# Patient Record
Sex: MALE | Race: WHITE | HISPANIC OR LATINO | URBAN - METROPOLITAN AREA
[De-identification: names, ages, dates, MRNs, and addresses within clinical notes are randomized per-mention and may not be internally consistent; named-entity substitution may affect disease eponyms.]

---

## 2017-04-05 ENCOUNTER — OUTPATIENT (OUTPATIENT)
Dept: OUTPATIENT SERVICES | Facility: HOSPITAL | Age: 37
LOS: 1 days | End: 2017-04-05
Payer: SELF-PAY

## 2017-04-05 ENCOUNTER — APPOINTMENT (OUTPATIENT)
Dept: FAMILY MEDICINE | Facility: HOSPITAL | Age: 37
End: 2017-04-05

## 2017-04-05 VITALS
SYSTOLIC BLOOD PRESSURE: 145 MMHG | TEMPERATURE: 98 F | HEART RATE: 86 BPM | WEIGHT: 315 LBS | BODY MASS INDEX: 46.65 KG/M2 | HEIGHT: 69 IN | DIASTOLIC BLOOD PRESSURE: 94 MMHG | RESPIRATION RATE: 16 BRPM

## 2017-04-05 PROCEDURE — 82607 VITAMIN B-12: CPT

## 2017-04-05 PROCEDURE — 85652 RBC SED RATE AUTOMATED: CPT

## 2017-04-05 PROCEDURE — G0463: CPT

## 2017-04-05 PROCEDURE — 84443 ASSAY THYROID STIM HORMONE: CPT

## 2017-04-05 PROCEDURE — 36415 COLL VENOUS BLD VENIPUNCTURE: CPT

## 2017-04-05 PROCEDURE — 82746 ASSAY OF FOLIC ACID SERUM: CPT

## 2017-04-29 ENCOUNTER — APPOINTMENT (OUTPATIENT)
Dept: FAMILY MEDICINE | Facility: HOSPITAL | Age: 37
End: 2017-04-29

## 2017-04-29 ENCOUNTER — OUTPATIENT (OUTPATIENT)
Dept: OUTPATIENT SERVICES | Facility: HOSPITAL | Age: 37
LOS: 1 days | End: 2017-04-29
Payer: SELF-PAY

## 2017-04-29 ENCOUNTER — RESULT CHARGE (OUTPATIENT)
Age: 37
End: 2017-04-29

## 2017-04-29 VITALS — DIASTOLIC BLOOD PRESSURE: 88 MMHG | SYSTOLIC BLOOD PRESSURE: 145 MMHG

## 2017-04-29 VITALS
OXYGEN SATURATION: 96 % | HEART RATE: 73 BPM | RESPIRATION RATE: 14 BRPM | DIASTOLIC BLOOD PRESSURE: 98 MMHG | SYSTOLIC BLOOD PRESSURE: 146 MMHG | TEMPERATURE: 97.6 F

## 2017-04-29 DIAGNOSIS — Z86.79 PERSONAL HISTORY OF OTHER DISEASES OF THE CIRCULATORY SYSTEM: ICD-10-CM

## 2017-04-29 DIAGNOSIS — Z87.891 PERSONAL HISTORY OF NICOTINE DEPENDENCE: ICD-10-CM

## 2017-04-29 LAB
BILIRUB UR QL STRIP: NEGATIVE
CLARITY UR: CLEAR
COLLECTION METHOD: NORMAL
GLUCOSE UR-MCNC: NEGATIVE
HCG UR QL: 0.2 EU/DL
HGB UR QL STRIP.AUTO: NORMAL
KETONES UR-MCNC: NEGATIVE
LEUKOCYTE ESTERASE UR QL STRIP: NEGATIVE
NITRITE UR QL STRIP: NEGATIVE
PH UR STRIP: 5.5
PROT UR STRIP-MCNC: NEGATIVE
SP GR UR STRIP: 1.02

## 2017-04-29 PROCEDURE — G0463: CPT

## 2017-09-06 ENCOUNTER — MEDICATION RENEWAL (OUTPATIENT)
Age: 37
End: 2017-09-06

## 2017-09-11 ENCOUNTER — OUTPATIENT (OUTPATIENT)
Dept: OUTPATIENT SERVICES | Facility: HOSPITAL | Age: 37
LOS: 1 days | End: 2017-09-11
Payer: SELF-PAY

## 2017-09-11 ENCOUNTER — APPOINTMENT (OUTPATIENT)
Dept: FAMILY MEDICINE | Facility: HOSPITAL | Age: 37
End: 2017-09-11

## 2017-09-11 VITALS
SYSTOLIC BLOOD PRESSURE: 137 MMHG | TEMPERATURE: 98.6 F | BODY MASS INDEX: 62.02 KG/M2 | DIASTOLIC BLOOD PRESSURE: 90 MMHG | HEART RATE: 75 BPM | RESPIRATION RATE: 16 BRPM | OXYGEN SATURATION: 97 % | WEIGHT: 315 LBS

## 2017-09-11 DIAGNOSIS — Z63.4 DISAPPEARANCE AND DEATH OF FAMILY MEMBER: ICD-10-CM

## 2017-09-11 RX ORDER — PREDNISONE 20 MG/1
20 TABLET ORAL DAILY
Qty: 14 | Refills: 0 | Status: DISCONTINUED | COMMUNITY
Start: 2017-04-29 | End: 2017-09-11

## 2017-09-11 SDOH — SOCIAL STABILITY - SOCIAL INSECURITY: DISSAPEARANCE AND DEATH OF FAMILY MEMBER: Z63.4

## 2017-09-12 PROCEDURE — 80061 LIPID PANEL: CPT

## 2017-09-12 PROCEDURE — G0463: CPT

## 2017-09-12 PROCEDURE — 83036 HEMOGLOBIN GLYCOSYLATED A1C: CPT

## 2017-09-12 PROCEDURE — 80053 COMPREHEN METABOLIC PANEL: CPT

## 2017-09-28 ENCOUNTER — EMERGENCY (EMERGENCY)
Facility: HOSPITAL | Age: 37
LOS: 1 days | Discharge: HOME | End: 2017-09-28
Admitting: EMERGENCY MEDICINE
Payer: SELF-PAY

## 2017-09-28 PROCEDURE — 99284 EMERGENCY DEPT VISIT MOD MDM: CPT

## 2017-09-28 PROCEDURE — 93010 ELECTROCARDIOGRAM REPORT: CPT

## 2017-09-28 PROCEDURE — 71010: CPT | Mod: 26

## 2017-09-29 PROCEDURE — 86788 WEST NILE VIRUS AB IGM: CPT

## 2017-09-29 PROCEDURE — 85027 COMPLETE CBC AUTOMATED: CPT

## 2017-09-29 PROCEDURE — 87086 URINE CULTURE/COLONY COUNT: CPT

## 2017-09-29 PROCEDURE — 96365 THER/PROPH/DIAG IV INF INIT: CPT

## 2017-09-29 PROCEDURE — 96375 TX/PRO/DX INJ NEW DRUG ADDON: CPT

## 2017-09-29 PROCEDURE — 96367 TX/PROPH/DG ADDL SEQ IV INF: CPT

## 2017-09-29 PROCEDURE — 71045 X-RAY EXAM CHEST 1 VIEW: CPT

## 2017-09-29 PROCEDURE — 85730 THROMBOPLASTIN TIME PARTIAL: CPT

## 2017-09-29 PROCEDURE — 93005 ELECTROCARDIOGRAM TRACING: CPT

## 2017-09-29 PROCEDURE — 86789 WEST NILE VIRUS ANTIBODY: CPT

## 2017-09-29 PROCEDURE — 80053 COMPREHEN METABOLIC PANEL: CPT

## 2017-09-29 PROCEDURE — 85610 PROTHROMBIN TIME: CPT

## 2017-09-29 PROCEDURE — 83605 ASSAY OF LACTIC ACID: CPT

## 2017-09-29 PROCEDURE — 81003 URINALYSIS AUTO W/O SCOPE: CPT

## 2017-09-29 PROCEDURE — 87798 DETECT AGENT NOS DNA AMP: CPT

## 2017-09-29 PROCEDURE — 99284 EMERGENCY DEPT VISIT MOD MDM: CPT | Mod: 25

## 2017-09-29 PROCEDURE — 87040 BLOOD CULTURE FOR BACTERIA: CPT

## 2017-10-01 ENCOUNTER — EMERGENCY (EMERGENCY)
Facility: HOSPITAL | Age: 37
LOS: 1 days | Discharge: HOME | End: 2017-10-01
Admitting: EMERGENCY MEDICINE
Payer: SELF-PAY

## 2017-10-01 PROCEDURE — 99053 MED SERV 10PM-8AM 24 HR FAC: CPT

## 2017-10-01 PROCEDURE — 70450 CT HEAD/BRAIN W/O DYE: CPT | Mod: 26

## 2017-10-01 PROCEDURE — 99285 EMERGENCY DEPT VISIT HI MDM: CPT | Mod: 25

## 2017-10-02 ENCOUNTER — OUTPATIENT (OUTPATIENT)
Dept: OUTPATIENT SERVICES | Facility: HOSPITAL | Age: 37
LOS: 1 days | End: 2017-10-02
Payer: SELF-PAY

## 2017-10-02 ENCOUNTER — APPOINTMENT (OUTPATIENT)
Dept: FAMILY MEDICINE | Facility: HOSPITAL | Age: 37
End: 2017-10-02

## 2017-10-02 VITALS
TEMPERATURE: 98.7 F | BODY MASS INDEX: 60.25 KG/M2 | DIASTOLIC BLOOD PRESSURE: 84 MMHG | HEART RATE: 82 BPM | RESPIRATION RATE: 16 BRPM | WEIGHT: 315 LBS | OXYGEN SATURATION: 100 % | SYSTOLIC BLOOD PRESSURE: 130 MMHG

## 2017-10-02 PROCEDURE — 96374 THER/PROPH/DIAG INJ IV PUSH: CPT

## 2017-10-02 PROCEDURE — 99284 EMERGENCY DEPT VISIT MOD MDM: CPT | Mod: 25

## 2017-10-02 PROCEDURE — 86787 VARICELLA-ZOSTER ANTIBODY: CPT

## 2017-10-02 PROCEDURE — 36415 COLL VENOUS BLD VENIPUNCTURE: CPT

## 2017-10-02 PROCEDURE — 80048 BASIC METABOLIC PNL TOTAL CA: CPT

## 2017-10-02 PROCEDURE — 70450 CT HEAD/BRAIN W/O DYE: CPT

## 2017-10-02 PROCEDURE — 85027 COMPLETE CBC AUTOMATED: CPT

## 2017-10-02 PROCEDURE — G0463: CPT

## 2017-10-02 PROCEDURE — 96375 TX/PRO/DX INJ NEW DRUG ADDON: CPT

## 2017-10-03 ENCOUNTER — OUTPATIENT (OUTPATIENT)
Dept: OUTPATIENT SERVICES | Facility: HOSPITAL | Age: 37
LOS: 1 days | End: 2017-10-03
Payer: COMMERCIAL

## 2017-10-03 ENCOUNTER — OUTPATIENT (OUTPATIENT)
Dept: OUTPATIENT SERVICES | Facility: HOSPITAL | Age: 37
LOS: 1 days | End: 2017-10-03

## 2017-10-03 ENCOUNTER — APPOINTMENT (OUTPATIENT)
Dept: MRI IMAGING | Facility: CLINIC | Age: 37
End: 2017-10-03
Payer: SELF-PAY

## 2017-10-03 DIAGNOSIS — R51 HEADACHE: ICD-10-CM

## 2017-10-03 PROCEDURE — 70551 MRI BRAIN STEM W/O DYE: CPT | Mod: 26

## 2017-10-03 PROCEDURE — 70551 MRI BRAIN STEM W/O DYE: CPT

## 2017-10-05 ENCOUNTER — APPOINTMENT (OUTPATIENT)
Dept: FAMILY MEDICINE | Facility: HOSPITAL | Age: 37
End: 2017-10-05

## 2017-10-05 ENCOUNTER — OUTPATIENT (OUTPATIENT)
Dept: OUTPATIENT SERVICES | Facility: HOSPITAL | Age: 37
LOS: 1 days | End: 2017-10-05
Payer: SELF-PAY

## 2017-10-05 VITALS
SYSTOLIC BLOOD PRESSURE: 127 MMHG | DIASTOLIC BLOOD PRESSURE: 83 MMHG | TEMPERATURE: 98.6 F | BODY MASS INDEX: 60.25 KG/M2 | RESPIRATION RATE: 16 BRPM | HEART RATE: 89 BPM | OXYGEN SATURATION: 97 % | WEIGHT: 315 LBS

## 2017-10-05 PROCEDURE — G0463: CPT

## 2018-01-04 ENCOUNTER — APPOINTMENT (OUTPATIENT)
Dept: NEUROLOGY | Facility: HOSPITAL | Age: 38
End: 2018-01-04

## 2018-03-10 ENCOUNTER — EMERGENCY (EMERGENCY)
Facility: HOSPITAL | Age: 38
LOS: 1 days | Discharge: HOME | End: 2018-03-10
Admitting: EMERGENCY MEDICINE
Payer: SELF-PAY

## 2018-03-10 ENCOUNTER — EMERGENCY (EMERGENCY)
Facility: HOSPITAL | Age: 38
LOS: 1 days | Discharge: HOME | End: 2018-03-10
Attending: EMERGENCY MEDICINE | Admitting: EMERGENCY MEDICINE
Payer: SELF-PAY

## 2018-03-10 VITALS
SYSTOLIC BLOOD PRESSURE: 164 MMHG | TEMPERATURE: 98 F | OXYGEN SATURATION: 95 % | RESPIRATION RATE: 16 BRPM | HEART RATE: 80 BPM | DIASTOLIC BLOOD PRESSURE: 97 MMHG

## 2018-03-10 DIAGNOSIS — Z90.49 ACQUIRED ABSENCE OF OTHER SPECIFIED PARTS OF DIGESTIVE TRACT: Chronic | ICD-10-CM

## 2018-03-10 LAB
BASOPHILS # BLD AUTO: 0.05 K/UL — SIGNIFICANT CHANGE UP (ref 0–0.2)
BASOPHILS NFR BLD AUTO: 0.5 % — SIGNIFICANT CHANGE UP (ref 0–2)
EOSINOPHIL # BLD AUTO: 0.13 K/UL — SIGNIFICANT CHANGE UP (ref 0–0.5)
EOSINOPHIL NFR BLD AUTO: 1.3 % — SIGNIFICANT CHANGE UP (ref 0–6)
HCT VFR BLD CALC: 41.5 % — SIGNIFICANT CHANGE UP (ref 39–50)
HGB BLD-MCNC: 13.1 G/DL — SIGNIFICANT CHANGE UP (ref 13–17)
IMM GRANULOCYTES # BLD AUTO: 0.05 # — SIGNIFICANT CHANGE UP
IMM GRANULOCYTES NFR BLD AUTO: 0.5 % — SIGNIFICANT CHANGE UP (ref 0–1.5)
LYMPHOCYTES # BLD AUTO: 3.1 K/UL — SIGNIFICANT CHANGE UP (ref 1–3.3)
LYMPHOCYTES # BLD AUTO: 31.1 % — SIGNIFICANT CHANGE UP (ref 13–44)
MCHC RBC-ENTMCNC: 29.1 PG — SIGNIFICANT CHANGE UP (ref 27–34)
MCHC RBC-ENTMCNC: 31.6 % — LOW (ref 32–36)
MCV RBC AUTO: 92.2 FL — SIGNIFICANT CHANGE UP (ref 80–100)
MONOCYTES # BLD AUTO: 0.48 K/UL — SIGNIFICANT CHANGE UP (ref 0–0.9)
MONOCYTES NFR BLD AUTO: 4.8 % — SIGNIFICANT CHANGE UP (ref 2–14)
NEUTROPHILS # BLD AUTO: 6.16 K/UL — SIGNIFICANT CHANGE UP (ref 1.8–7.4)
NEUTROPHILS NFR BLD AUTO: 61.8 % — SIGNIFICANT CHANGE UP (ref 43–77)
NRBC # FLD: 0 — SIGNIFICANT CHANGE UP
PLATELET # BLD AUTO: 266 K/UL — SIGNIFICANT CHANGE UP (ref 150–400)
PMV BLD: 10.1 FL — SIGNIFICANT CHANGE UP (ref 7–13)
RBC # BLD: 4.5 M/UL — SIGNIFICANT CHANGE UP (ref 4.2–5.8)
RBC # FLD: 12.6 % — SIGNIFICANT CHANGE UP (ref 10.3–14.5)
WBC # BLD: 9.97 K/UL — SIGNIFICANT CHANGE UP (ref 3.8–10.5)
WBC # FLD AUTO: 9.97 K/UL — SIGNIFICANT CHANGE UP (ref 3.8–10.5)

## 2018-03-10 PROCEDURE — 99285 EMERGENCY DEPT VISIT HI MDM: CPT

## 2018-03-10 RX ORDER — KETOROLAC TROMETHAMINE 30 MG/ML
30 SYRINGE (ML) INJECTION ONCE
Qty: 0 | Refills: 0 | Status: DISCONTINUED | OUTPATIENT
Start: 2018-03-10 | End: 2018-03-10

## 2018-03-10 RX ORDER — DIPHENHYDRAMINE HCL 50 MG
25 CAPSULE ORAL ONCE
Qty: 0 | Refills: 0 | Status: COMPLETED | OUTPATIENT
Start: 2018-03-10 | End: 2018-03-10

## 2018-03-10 RX ORDER — MUPIROCIN 20 MG/G
1 OINTMENT TOPICAL
Qty: 0 | Refills: 0 | Status: DISCONTINUED | OUTPATIENT
Start: 2018-03-10 | End: 2018-03-14

## 2018-03-10 RX ADMIN — Medication 30 MILLIGRAM(S): at 23:35

## 2018-03-10 RX ADMIN — Medication 30 MILLIGRAM(S): at 23:50

## 2018-03-10 RX ADMIN — Medication 25 MILLIGRAM(S): at 23:42

## 2018-03-10 NOTE — ED ADULT TRIAGE NOTE - CHIEF COMPLAINT QUOTE
Pt brought in as transfer from [place] with c/o abscess to L ear x 3d. States buzzing/ringing in affected ear with mild discharge. Also c/o itching spreading to neck area. Denies HA, fevers, or any adverse symptoms at this time.

## 2018-03-10 NOTE — ED PROVIDER NOTE - PROGRESS NOTE DETAILS
Carlos att: ENT incising and draining left earlobe. CDU to accept patient for iv abx and reassessment pending CDU KELLIE reddy.

## 2018-03-10 NOTE — ED PROVIDER NOTE - OBJECTIVE STATEMENT
23:35 Carlos att: 38M transfer from [place] left ear abscess +/- perichondritis. Non-diabetic obese male. Three days ago patient scratched his left ear. Following day significant other noted lt ear swelling and placed alcohol and bacitracin. Since that time swelling redness and tenderness extended to left posterior auricular area, left upper neck and left cheek. Denies fever, chills, auditory changes. Eval [place] ED, 18:13 rec'd iv cipro for pseudomonal coverage and Iv vanco for mrsa coverage, transfer to [facility] acccepted by [facility] ENT Dr. Rios. 23:35 Patient eval in [facility] Intake by ENT resident, neg CT will IND and send wound culture.

## 2018-03-10 NOTE — ED ADULT NURSE NOTE - ED STAT RN HANDOFF DETAILS
Patient is A&Ox4, aware of plan of care and in NAD, and has CDU room available.  Report given to MAZIN Saavedra.  Patient awaiting transportation.  Will continue to monitor patient closely. DUSTY Bajwa R.N.

## 2018-03-10 NOTE — ED PROVIDER NOTE - MEDICAL DECISION MAKING DETAILS
Left earlobe abscess with surrounding left face cellulitis PLAN ent c/s, ent to ind, cdu for iv abx and reassessment

## 2018-03-10 NOTE — ED ADULT NURSE NOTE - OBJECTIVE STATEMENT
rec'd pt in room intake 3 with wife at [facility]. pt is trfr from [place] waiting for ent. has swelling, pain, discharge from left external ear. waiting to be seen. pt didn't take his bp meds today. md aware. rec'd pt in room intake 3 with wife at [facility]. pt is trfr from [place] waiting for ent. has swelling, pain, discharge from left external ear. waiting to be seen. pt didn't take his bp meds today. md aware. PT TAKES ENALAPRIL 25 AND HCTZ 12.5

## 2018-03-10 NOTE — ED ADULT NURSE NOTE - CHIEF COMPLAINT QUOTE
Pt brought in as transfer from [place] with c/o abscess to L ear x 3d. States buzzing/ringing in affected ear with mild discharge. Also c/o itching spreading to neck area. Denies HA, fevers, or any adverse symptoms at this time.

## 2018-03-10 NOTE — ED PROVIDER NOTE - SKIN COLOR
lt earlobe flaky red swollen tender, neg cartilage swelling, lt tm and ear canal clear. Redness and swelling extends from lt temple, entire left cheek and down to left jawline

## 2018-03-10 NOTE — CONSULT NOTE ADULT - SUBJECTIVE AND OBJECTIVE BOX
38 M hx of htn and obesity presents as transfer with L ear pain.  Pt reports having a small pustule 2-3 days ago which he scratched off and a small amount of pus came out.  He reports it has grown progressively more swollen and sore since then so he came to ED.  No hearing loss, tinnitus, dizziness, otorrhea.  He reports the surrounding skin has become somewhat itchy today.    PMH/PSH above plus YISSEL  Allergies: morphine, PCN    Vital Signs Last 24 Hrs  T(C): 36.9 (10 Mar 2018 22:19), Max: 36.9 (10 Mar 2018 22:19)  T(F): 98.5 (10 Mar 2018 22:19), Max: 98.5 (10 Mar 2018 22:19)  HR: 76 (10 Mar 2018 22:56) (76 - 85)  BP: 153/85 (10 Mar 2018 22:56) (153/85 - 185/109)  BP(mean): --  RR: 18 (10 Mar 2018 22:56) (16 - 18)  SpO2: 98% (10 Mar 2018 22:56) (95% - 100%)    PHYSICAL EXAM:  Constitutional Normal: awake, alert,  well developed, no acute distress  Face symmetric, CNs grossly intact  AS: some crusting and fulness of lobule, EAC and TM normal, some linear urticaria overlying mastoid.  questionable fluctuance of lobule  ADS: Normal external ear, EAC, and TM		  External Nose:  Normal, no structural deformities	  Anterior Nasal Cavity patent bilaterally, pink moist mucosa			  Oral Cavity:  pink moist mucosa, tongue midline, no lesions 1+ tonsils, crowded oropharynx  Neck: obese      procedure: after verbal consent, 1cc of lidocaine with epi was injected in posterior lobule, a small incision was made and through skin into lobule fat and the wound was explored with a q tip and 18G needle.  No abscess.                          13.1   9.97  )-----------( 266      ( 10 Mar 2018 23:30 )             41.5

## 2018-03-11 VITALS
SYSTOLIC BLOOD PRESSURE: 156 MMHG | RESPIRATION RATE: 18 BRPM | DIASTOLIC BLOOD PRESSURE: 98 MMHG | TEMPERATURE: 98 F | OXYGEN SATURATION: 97 % | HEART RATE: 75 BPM

## 2018-03-11 LAB
ALBUMIN SERPL ELPH-MCNC: 3.9 G/DL — SIGNIFICANT CHANGE UP (ref 3.3–5)
ALP SERPL-CCNC: 93 U/L — SIGNIFICANT CHANGE UP (ref 40–120)
ALT FLD-CCNC: 31 U/L — SIGNIFICANT CHANGE UP (ref 4–41)
AST SERPL-CCNC: 20 U/L — SIGNIFICANT CHANGE UP (ref 4–40)
BILIRUB SERPL-MCNC: 0.5 MG/DL — SIGNIFICANT CHANGE UP (ref 0.2–1.2)
BUN SERPL-MCNC: 19 MG/DL — SIGNIFICANT CHANGE UP (ref 7–23)
CALCIUM SERPL-MCNC: 8.7 MG/DL — SIGNIFICANT CHANGE UP (ref 8.4–10.5)
CHLORIDE SERPL-SCNC: 105 MMOL/L — SIGNIFICANT CHANGE UP (ref 98–107)
CO2 SERPL-SCNC: 24 MMOL/L — SIGNIFICANT CHANGE UP (ref 22–31)
CREAT SERPL-MCNC: 0.82 MG/DL — SIGNIFICANT CHANGE UP (ref 0.5–1.3)
GLUCOSE SERPL-MCNC: 92 MG/DL — SIGNIFICANT CHANGE UP (ref 70–99)
HBA1C BLD-MCNC: 5.3 % — SIGNIFICANT CHANGE UP (ref 4–5.6)
POTASSIUM SERPL-MCNC: 3.6 MMOL/L — SIGNIFICANT CHANGE UP (ref 3.5–5.3)
POTASSIUM SERPL-SCNC: 3.6 MMOL/L — SIGNIFICANT CHANGE UP (ref 3.5–5.3)
PROT SERPL-MCNC: 7.7 G/DL — SIGNIFICANT CHANGE UP (ref 6–8.3)
SODIUM SERPL-SCNC: 143 MMOL/L — SIGNIFICANT CHANGE UP (ref 135–145)

## 2018-03-11 PROCEDURE — 85027 COMPLETE CBC AUTOMATED: CPT

## 2018-03-11 PROCEDURE — 80053 COMPREHEN METABOLIC PANEL: CPT

## 2018-03-11 PROCEDURE — 96375 TX/PRO/DX INJ NEW DRUG ADDON: CPT

## 2018-03-11 PROCEDURE — 96374 THER/PROPH/DIAG INJ IV PUSH: CPT

## 2018-03-11 PROCEDURE — 99236 HOSP IP/OBS SAME DATE HI 85: CPT

## 2018-03-11 PROCEDURE — 99285 EMERGENCY DEPT VISIT HI MDM: CPT | Mod: 25

## 2018-03-11 RX ORDER — HYDROCHLOROTHIAZIDE 25 MG
12.5 TABLET ORAL DAILY
Qty: 0 | Refills: 0 | Status: DISCONTINUED | OUTPATIENT
Start: 2018-03-11 | End: 2018-03-14

## 2018-03-11 RX ORDER — MUPIROCIN 20 MG/G
1 OINTMENT TOPICAL
Qty: 1 | Refills: 0
Start: 2018-03-11 | End: 2018-03-20

## 2018-03-11 RX ORDER — CIPROFLOXACIN LACTATE 400MG/40ML
400 VIAL (ML) INTRAVENOUS EVERY 12 HOURS
Qty: 0 | Refills: 0 | Status: DISCONTINUED | OUTPATIENT
Start: 2018-03-11 | End: 2018-03-14

## 2018-03-11 RX ADMIN — Medication 100 MILLIGRAM(S): at 04:23

## 2018-03-11 RX ADMIN — Medication 20 MILLIGRAM(S): at 06:01

## 2018-03-11 RX ADMIN — Medication 200 MILLIGRAM(S): at 06:01

## 2018-03-11 RX ADMIN — MUPIROCIN 1 APPLICATION(S): 20 OINTMENT TOPICAL at 00:35

## 2018-03-11 RX ADMIN — Medication 12.5 MILLIGRAM(S): at 06:09

## 2018-03-11 NOTE — ED CDU PROVIDER INITIAL DAY NOTE - MEDICAL DECISION MAKING DETAILS
38 year old male with a PMHx of sleep apnea, HTN pw left earlobe redness, swelling that extends to the neck for 3 days after scratching a scab from his ear  Plan: IV abx

## 2018-03-11 NOTE — ED CDU PROVIDER INITIAL DAY NOTE - PROGRESS NOTE DETAILS
Patient states he is feeling better after antibiotic administration. States facial swelling has improved. VS stable  Heart- RRR s1s2 nl Lungs - clear bilaterally abd - soft NT ND extrem- no edema or cyanosis  left ear with some induration and redness still present  face with minimal erythema surrounding left ear,  Plan for continued antibiotics, mupirocin ointment and ENT follow up as outpatient.

## 2018-03-11 NOTE — ED CDU PROVIDER DISPOSITION NOTE - CLINICAL COURSE
-placed in observation for IV abx/monitoring of cellulitis  -symptomatically improved, afebrile  -cleared by ENT for d/c home on oral clindamycin x 10d and mupirocin

## 2018-03-11 NOTE — PROGRESS NOTE ADULT - SUBJECTIVE AND OBJECTIVE BOX
Pt seen and examined this AM. No acute events overnight. Afebrile.   S/P I&D yesterday with no purulence obtained     Vital Signs Last 24 Hrs  T(C): 36.6 (11 Mar 2018 09:44), Max: 36.9 (10 Mar 2018 22:19)  T(F): 97.8 (11 Mar 2018 09:44), Max: 98.5 (10 Mar 2018 22:19)  HR: 75 (11 Mar 2018 09:44) (71 - 85)  BP: 156/98 (11 Mar 2018 09:44) (146/88 - 185/109)  BP(mean): --  RR: 18 (11 Mar 2018 09:44) (16 - 18)  SpO2: 97% (11 Mar 2018 09:44) (95% - 100%)    NAD, awake and alert   Face symmetric, CNs grossly intact  AS: crusting, erythema and fullness of lobule, EAC normal, lobule soft   ADS: Normal external ear, EAC		  External Nose:  Normal, no structural deformities	  Neck: obese        Assessment and Recommendation:   · Assessment		  38M with cellulitis isolated to left ear lobule.  No abscess on attempted I&D, no cartilage involvement, no extension onto face, neck or mastoid.  -clindamycin x10 days  -mupirocin ointment  -pain medication  -can dc from ENT standpoint  -f/u in 2 weeks in clinic

## 2018-03-11 NOTE — ED CDU PROVIDER INITIAL DAY NOTE - OBJECTIVE STATEMENT
38 year old male with a PMHx of sleep apnea, HTN pw left earlobe redness, swelling that extends to the neck for 3 days after scratching a scab from his ear. Pt was transferred from [facility] for ENT eval for abscess vs perichondritis. Pt received 1 dose of vanco/cipro at 6:15 pm at [facility]. ENT evaluated the patient I&D'ed wound, sent wound culture and recommended clinda. Pt sent to CDU for IV abx.

## 2018-03-11 NOTE — ED CDU PROVIDER INITIAL DAY NOTE - ATTENDING CONTRIBUTION TO CARE
37yo M PMH: morbid obesity, YISSEL on CPAP, HTN p/w left earlobe redness, swelling that extends to the neck for 3 days, found to have L ear/facial cellulitis, s/p I&D without abscess drainage by ENT. Patient reports significant improvement in pain/swelling, (-) fevers. Reevaluated by ENT this morning and cleared for d/c home. ENT recommends continuing clindamycin and mupirocin.

## 2018-04-09 ENCOUNTER — OUTPATIENT (OUTPATIENT)
Dept: OUTPATIENT SERVICES | Facility: HOSPITAL | Age: 38
LOS: 1 days | End: 2018-04-09
Payer: SELF-PAY

## 2018-04-09 ENCOUNTER — APPOINTMENT (OUTPATIENT)
Dept: FAMILY MEDICINE | Facility: HOSPITAL | Age: 38
End: 2018-04-09

## 2018-04-09 VITALS
OXYGEN SATURATION: 98 % | DIASTOLIC BLOOD PRESSURE: 97 MMHG | SYSTOLIC BLOOD PRESSURE: 149 MMHG | HEART RATE: 75 BPM | TEMPERATURE: 98.1 F | RESPIRATION RATE: 16 BRPM

## 2018-04-09 DIAGNOSIS — Z90.49 ACQUIRED ABSENCE OF OTHER SPECIFIED PARTS OF DIGESTIVE TRACT: Chronic | ICD-10-CM

## 2018-04-09 DIAGNOSIS — Z00.00 ENCOUNTER FOR GENERAL ADULT MEDICAL EXAMINATION WITHOUT ABNORMAL FINDINGS: ICD-10-CM

## 2018-04-09 PROCEDURE — G0463: CPT

## 2018-04-11 DIAGNOSIS — L91.8 OTHER HYPERTROPHIC DISORDERS OF THE SKIN: ICD-10-CM

## 2018-04-16 ENCOUNTER — OUTPATIENT (OUTPATIENT)
Dept: OUTPATIENT SERVICES | Facility: HOSPITAL | Age: 38
LOS: 1 days | End: 2018-04-16
Payer: SELF-PAY

## 2018-04-16 ENCOUNTER — APPOINTMENT (OUTPATIENT)
Dept: FAMILY MEDICINE | Facility: HOSPITAL | Age: 38
End: 2018-04-16
Payer: SELF-PAY

## 2018-04-16 ENCOUNTER — RESULT REVIEW (OUTPATIENT)
Age: 38
End: 2018-04-16

## 2018-04-16 VITALS
SYSTOLIC BLOOD PRESSURE: 153 MMHG | OXYGEN SATURATION: 96 % | HEART RATE: 73 BPM | TEMPERATURE: 97.67 F | RESPIRATION RATE: 16 BRPM | DIASTOLIC BLOOD PRESSURE: 103 MMHG

## 2018-04-16 VITALS — SYSTOLIC BLOOD PRESSURE: 144 MMHG | DIASTOLIC BLOOD PRESSURE: 90 MMHG

## 2018-04-16 DIAGNOSIS — L91.8 OTHER HYPERTROPHIC DISORDERS OF THE SKIN: ICD-10-CM

## 2018-04-16 DIAGNOSIS — Z90.49 ACQUIRED ABSENCE OF OTHER SPECIFIED PARTS OF DIGESTIVE TRACT: Chronic | ICD-10-CM

## 2018-04-16 DIAGNOSIS — Z00.00 ENCOUNTER FOR GENERAL ADULT MEDICAL EXAMINATION WITHOUT ABNORMAL FINDINGS: ICD-10-CM

## 2018-04-16 PROCEDURE — 88304 TISSUE EXAM BY PATHOLOGIST: CPT

## 2018-04-16 PROCEDURE — 88304 TISSUE EXAM BY PATHOLOGIST: CPT | Mod: 26

## 2018-04-16 PROCEDURE — G0463: CPT

## 2018-04-17 DIAGNOSIS — L91.8 OTHER HYPERTROPHIC DISORDERS OF THE SKIN: ICD-10-CM

## 2018-05-10 ENCOUNTER — EMERGENCY (EMERGENCY)
Facility: HOSPITAL | Age: 38
LOS: 0 days | Discharge: HOME | End: 2018-05-11
Attending: EMERGENCY MEDICINE | Admitting: EMERGENCY MEDICINE
Payer: SELF-PAY

## 2018-05-10 VITALS — WEIGHT: 315 LBS | HEIGHT: 70 IN

## 2018-05-10 DIAGNOSIS — Z90.49 ACQUIRED ABSENCE OF OTHER SPECIFIED PARTS OF DIGESTIVE TRACT: Chronic | ICD-10-CM

## 2018-05-10 PROCEDURE — 72125 CT NECK SPINE W/O DYE: CPT | Mod: 26

## 2018-05-10 PROCEDURE — 99284 EMERGENCY DEPT VISIT MOD MDM: CPT

## 2018-05-10 RX ORDER — CYCLOBENZAPRINE HYDROCHLORIDE 10 MG/1
10 TABLET, FILM COATED ORAL ONCE
Qty: 0 | Refills: 0 | Status: COMPLETED | OUTPATIENT
Start: 2018-05-10 | End: 2018-05-10

## 2018-05-10 RX ORDER — KETOROLAC TROMETHAMINE 30 MG/ML
60 SYRINGE (ML) INJECTION ONCE
Qty: 0 | Refills: 0 | Status: DISCONTINUED | OUTPATIENT
Start: 2018-05-10 | End: 2018-05-10

## 2018-05-10 RX ORDER — LIDOCAINE 4 G/100G
1 CREAM TOPICAL ONCE
Qty: 0 | Refills: 0 | Status: COMPLETED | OUTPATIENT
Start: 2018-05-10 | End: 2018-05-10

## 2018-05-10 RX ORDER — CYCLOBENZAPRINE HYDROCHLORIDE 10 MG/1
1 TABLET, FILM COATED ORAL
Qty: 5 | Refills: 0 | OUTPATIENT
Start: 2018-05-10

## 2018-05-10 RX ADMIN — CYCLOBENZAPRINE HYDROCHLORIDE 10 MILLIGRAM(S): 10 TABLET, FILM COATED ORAL at 23:11

## 2018-05-10 RX ADMIN — LIDOCAINE 1 PATCH: 4 CREAM TOPICAL at 23:15

## 2018-05-10 RX ADMIN — Medication 50 MILLIGRAM(S): at 23:09

## 2018-05-10 RX ADMIN — Medication 60 MILLIGRAM(S): at 23:09

## 2018-05-10 NOTE — ED ADULT TRIAGE NOTE - CHIEF COMPLAINT QUOTE
Patient presents stating he has neck, back and right arm pain since Monday. Patient denies injury or trauma to area. Denies shortness of breath or chest pain. Hx of high blood pressure

## 2018-05-10 NOTE — ED STATDOCS - MUSCULOSKELETAL, MLM
range of motion is not limited and there is no muscle tenderness. range of motion is not limited. +right trapezius muscle TTP, right dorsal forearm muscle TTP, lower C-spine TTP.

## 2018-05-10 NOTE — ED ADULT NURSE NOTE - OBJECTIVE STATEMENT
c/o right upper back pain, right shoulder and right arm pain started on monday, c/o neck pain, pt denies acute injury to area, pt states he has been taking aleve and tylenol with no relief in pain, pt took tramadol with no relief in pain

## 2018-05-10 NOTE — ED STATDOCS - MEDICAL DECISION MAKING DETAILS
CT cervical spine, prednisone by mouth, pain medication by mouth, Toradol injection, ortho referral.

## 2018-05-10 NOTE — ED STATDOCS - OBJECTIVE STATEMENT
37 y/o m with PMHx of fatty liver, HTN, s/p cholecystectomy presenting to the ED c/o neck, back, right arm, right shoulder pain x3 days. Patient denies injury or trauma to area. Also reports weakness or numbness to area. Pt states he cannot hold a pen secondary to pain/weakness. Denies SOB, CP. Right handed. Pt seen at [facility] ED x4 months ago for nerve pain to neck and treated with gabapentin. Pt took left over tramadol rx x3 times today. Nonsmoker.

## 2018-05-10 NOTE — ED STATDOCS - GASTROINTESTINAL, MLM
abdomen soft, non-tender, and non-distended. Bowel sounds present. +morbidly obese abd. abdomen soft, non-tender, and non-distended. Bowel sounds present.

## 2018-05-11 VITALS
DIASTOLIC BLOOD PRESSURE: 96 MMHG | HEART RATE: 81 BPM | TEMPERATURE: 98 F | OXYGEN SATURATION: 96 % | RESPIRATION RATE: 18 BRPM | SYSTOLIC BLOOD PRESSURE: 151 MMHG

## 2018-05-11 RX ORDER — CYCLOBENZAPRINE HYDROCHLORIDE 10 MG/1
1 TABLET, FILM COATED ORAL
Qty: 5 | Refills: 0
Start: 2018-05-11

## 2018-05-12 DIAGNOSIS — M50.122 CERVICAL DISC DISORDER AT C5-C6 LEVEL WITH RADICULOPATHY: ICD-10-CM

## 2018-09-27 PROBLEM — K76.0 FATTY (CHANGE OF) LIVER, NOT ELSEWHERE CLASSIFIED: Chronic | Status: ACTIVE | Noted: 2018-03-10

## 2018-09-27 PROBLEM — I10 ESSENTIAL (PRIMARY) HYPERTENSION: Chronic | Status: ACTIVE | Noted: 2018-03-10

## 2018-10-03 ENCOUNTER — APPOINTMENT (OUTPATIENT)
Dept: FAMILY MEDICINE | Facility: HOSPITAL | Age: 38
End: 2018-10-03

## 2018-10-03 ENCOUNTER — OUTPATIENT (OUTPATIENT)
Dept: OUTPATIENT SERVICES | Facility: HOSPITAL | Age: 38
LOS: 1 days | End: 2018-10-03
Payer: MEDICAID

## 2018-10-03 ENCOUNTER — LABORATORY RESULT (OUTPATIENT)
Age: 38
End: 2018-10-03

## 2018-10-03 VITALS
DIASTOLIC BLOOD PRESSURE: 77 MMHG | TEMPERATURE: 97.5 F | SYSTOLIC BLOOD PRESSURE: 131 MMHG | BODY MASS INDEX: 62.47 KG/M2 | HEART RATE: 73 BPM | RESPIRATION RATE: 16 BRPM | WEIGHT: 315 LBS | OXYGEN SATURATION: 97 %

## 2018-10-03 DIAGNOSIS — M62.08 SEPARATION OF MUSCLE (NONTRAUMATIC), OTHER SITE: ICD-10-CM

## 2018-10-03 DIAGNOSIS — Z00.00 ENCOUNTER FOR GENERAL ADULT MEDICAL EXAMINATION WITHOUT ABNORMAL FINDINGS: ICD-10-CM

## 2018-10-03 DIAGNOSIS — Z90.49 ACQUIRED ABSENCE OF OTHER SPECIFIED PARTS OF DIGESTIVE TRACT: Chronic | ICD-10-CM

## 2018-10-03 PROCEDURE — G0463: CPT

## 2018-10-03 PROCEDURE — 80061 LIPID PANEL: CPT

## 2018-10-03 PROCEDURE — 83036 HEMOGLOBIN GLYCOSYLATED A1C: CPT

## 2018-10-03 PROCEDURE — 80053 COMPREHEN METABOLIC PANEL: CPT

## 2018-10-03 RX ORDER — GABAPENTIN 100 MG/1
100 CAPSULE ORAL
Qty: 30 | Refills: 0 | Status: DISCONTINUED | COMMUNITY
Start: 2017-10-02 | End: 2018-10-03

## 2018-10-03 RX ORDER — OXYCODONE 5 MG/1
5 TABLET ORAL EVERY 6 HOURS
Qty: 12 | Refills: 0 | Status: DISCONTINUED | COMMUNITY
Start: 2017-10-02 | End: 2018-10-03

## 2018-10-03 NOTE — HISTORY OF PRESENT ILLNESS
[FreeTextEntry1] : CPE [de-identified] : 38 year old male pt here today for CPE. States he feels well but would like to be referred to bariatric surgery for evaluation for a bypass. Believes he may have an abd wall hernia. No symptoms noted but has a bulge in abd when he lays down and attempts to get up.

## 2018-10-03 NOTE — HEALTH RISK ASSESSMENT
[None] : None [With Significant Other] : lives with significant other [Employed] : employed [] :  [Sexually Active] : sexually active [Fully functional (bathing, dressing, toileting, transferring, walking, feeding)] : Fully functional (bathing, dressing, toileting, transferring, walking, feeding) [Fully functional (using the telephone, shopping, preparing meals, housekeeping, doing laundry, using] : Fully functional and needs no help or supervision to perform IADLs (using the telephone, shopping, preparing meals, housekeeping, doing laundry, using transportation, managing medications and managing finances) [No changes since last discussed ___] : No changes since last discussed  [unfilled] [Change in mental status noted] : No change in mental status noted [Language] : denies difficulty with language [Behavior] : denies difficulty with behavior [Learning/Retaining New Information] : denies difficulty learning/retaining new information [Handling Complex Tasks] : denies difficulty handling complex tasks [Reasoning] : denies difficulty with reasoning [Spatial Ability and Orientation] : denies difficulty with spatial ability and orientation [High Risk Behavior] : no high risk behavior [Reports changes in hearing] : Reports no changes in hearing [Reports changes in vision] : Reports no changes in vision [Reports changes in dental health] : Reports no changes in dental health [Smoke Detector] : no smoke detector [Carbon Monoxide Detector] : no carbon monoxide detector [Guns at Home] : no guns at home [Seat Belt] : does not use seat belt [Sunscreen] : does not use sunscreen [Travel to Developing Areas] : does not  travel to developing areas

## 2018-10-03 NOTE — ASSESSMENT
[FreeTextEntry1] : 38 year old male pt here today for CPE\par \par #Diastasis Recti\par -currently asymptomatic \par -strengthening exercises given to pt\par \par #Health Maintenance\par -bariatric surgery referral given to pt\par -f/u cbc, cmp, A1C, lipid panel\par -declined HIV, G/C testing\par -TDAP and Flu given today\par \par -RTC prn for acute or in 1 year for a CPE

## 2018-10-03 NOTE — COUNSELING
[Weight management counseling provided] : Weight management [Healthy eating counseling provided] : healthy eating [Activity counseling provided] : activity [Keep Food Diary] : Keep food diary [Low Fat Diet] : Low fat diet [Low Salt Diet] : Low salt diet [___ min/wk activity recommended] : [unfilled] min/wk activity recommended [Walking] : Walking [None] : None

## 2018-10-09 LAB
ALBUMIN SERPL ELPH-MCNC: 4 G/DL
ALP BLD-CCNC: 85 U/L
ALT SERPL-CCNC: 24 U/L
ANION GAP SERPL CALC-SCNC: 12 MMOL/L
AST SERPL-CCNC: 23 U/L
BASOPHILS # BLD AUTO: 0.02 K/UL
BASOPHILS NFR BLD AUTO: 0.2 %
BILIRUB SERPL-MCNC: 0.5 MG/DL
BUN SERPL-MCNC: 18 MG/DL
CALCIUM SERPL-MCNC: 9.5 MG/DL
CHLORIDE SERPL-SCNC: 105 MMOL/L
CHOLEST SERPL-MCNC: 141 MG/DL
CHOLEST/HDLC SERPL: 3.4 RATIO
CO2 SERPL-SCNC: 27 MMOL/L
CREAT SERPL-MCNC: 0.91 MG/DL
EOSINOPHIL # BLD AUTO: 0.15 K/UL
EOSINOPHIL NFR BLD AUTO: 1.6 %
ESTIMATED AVERAGE GLUCOSE: 97 MG/DL
GLUCOSE SERPL-MCNC: 76 MG/DL
HBA1C MFR BLD HPLC: 5 %
HCT VFR BLD CALC: 42.2 %
HDLC SERPL-MCNC: 41 MG/DL
HGB BLD-MCNC: 13.7 G/DL
IMM GRANULOCYTES NFR BLD AUTO: 0.2 %
LDLC SERPL CALC-MCNC: 78 MG/DL
LYMPHOCYTES # BLD AUTO: 2.18 K/UL
LYMPHOCYTES NFR BLD AUTO: 23.9 %
MAN DIFF?: NORMAL
MCHC RBC-ENTMCNC: 29.9 PG
MCHC RBC-ENTMCNC: 32.5 GM/DL
MCV RBC AUTO: 92.1 FL
MONOCYTES # BLD AUTO: 0.62 K/UL
MONOCYTES NFR BLD AUTO: 6.8 %
NEUTROPHILS # BLD AUTO: 6.14 K/UL
NEUTROPHILS NFR BLD AUTO: 67.3 %
PLATELET # BLD AUTO: 258 K/UL
POTASSIUM SERPL-SCNC: 3.9 MMOL/L
PROT SERPL-MCNC: 8.1 G/DL
RBC # BLD: 4.58 M/UL
RBC # FLD: 13.6 %
SODIUM SERPL-SCNC: 144 MMOL/L
TRIGL SERPL-MCNC: 108 MG/DL
WBC # FLD AUTO: 9.13 K/UL

## 2018-11-01 ENCOUNTER — APPOINTMENT (OUTPATIENT)
Dept: BARIATRICS | Facility: CLINIC | Age: 38
End: 2018-11-01
Payer: MEDICAID

## 2018-11-01 VITALS
DIASTOLIC BLOOD PRESSURE: 88 MMHG | BODY MASS INDEX: 47.19 KG/M2 | HEART RATE: 60 BPM | OXYGEN SATURATION: 96 % | WEIGHT: 315 LBS | HEIGHT: 68.5 IN | SYSTOLIC BLOOD PRESSURE: 134 MMHG

## 2018-11-01 PROCEDURE — 99205 OFFICE O/P NEW HI 60 MIN: CPT

## 2018-12-03 ENCOUNTER — APPOINTMENT (OUTPATIENT)
Dept: FAMILY MEDICINE | Facility: HOSPITAL | Age: 38
End: 2018-12-03

## 2018-12-05 ENCOUNTER — OUTPATIENT (OUTPATIENT)
Dept: OUTPATIENT SERVICES | Facility: HOSPITAL | Age: 38
LOS: 1 days | End: 2018-12-05
Payer: MEDICAID

## 2018-12-05 ENCOUNTER — APPOINTMENT (OUTPATIENT)
Dept: FAMILY MEDICINE | Facility: HOSPITAL | Age: 38
End: 2018-12-05

## 2018-12-05 VITALS
BODY MASS INDEX: 60.68 KG/M2 | DIASTOLIC BLOOD PRESSURE: 100 MMHG | HEART RATE: 81 BPM | OXYGEN SATURATION: 96 % | TEMPERATURE: 97.9 F | SYSTOLIC BLOOD PRESSURE: 149 MMHG | RESPIRATION RATE: 14 BRPM | WEIGHT: 315 LBS

## 2018-12-05 VITALS — SYSTOLIC BLOOD PRESSURE: 135 MMHG | DIASTOLIC BLOOD PRESSURE: 85 MMHG

## 2018-12-05 DIAGNOSIS — Z90.49 ACQUIRED ABSENCE OF OTHER SPECIFIED PARTS OF DIGESTIVE TRACT: Chronic | ICD-10-CM

## 2018-12-05 DIAGNOSIS — Z00.00 ENCOUNTER FOR GENERAL ADULT MEDICAL EXAMINATION WITHOUT ABNORMAL FINDINGS: ICD-10-CM

## 2018-12-05 PROCEDURE — G0463: CPT

## 2018-12-05 NOTE — HISTORY OF PRESENT ILLNESS
[FreeTextEntry1] : Weight Check [de-identified] : 38 year old male with PMH of Morbid obesity is here for a weight check. Pt is in the process of scheduling bariatric surgery and he needs to be weighed every month. Pt has been on a low carb diet and has been walking more than usual daily. Today weight is 405 lbs. Feels well.  Denies any complaints. No Fever, Chills, Nausea, Vomiting, Diarrhea, Headache, Chest Pain, Shortness of breath or Abdominal pain.\par

## 2018-12-05 NOTE — ASSESSMENT
[FreeTextEntry1] : # Morbid Obesity\par - Cont current diet and exercise regimen\par - rtc in 1 month for weight check\par - f/u with Surgery, Pulmonology, Cardiology and Psych

## 2018-12-05 NOTE — PHYSICAL EXAM
[No Acute Distress] : no acute distress [Well Nourished] : well nourished [Well Developed] : well developed [Well-Appearing] : well-appearing [No Respiratory Distress] : no respiratory distress  [Clear to Auscultation] : lungs were clear to auscultation bilaterally [No Accessory Muscle Use] : no accessory muscle use [Normal Rate] : normal rate  [Regular Rhythm] : with a regular rhythm [Normal S1, S2] : normal S1 and S2 [No Murmur] : no murmur heard [Soft] : abdomen soft [Non Tender] : non-tender [Non-distended] : non-distended [No Masses] : no abdominal mass palpated [No HSM] : no HSM [Normal Bowel Sounds] : normal bowel sounds [Normal Affect] : the affect was normal [Alert and Oriented x3] : oriented to person, place, and time [Normal Insight/Judgement] : insight and judgment were intact

## 2018-12-06 DIAGNOSIS — E66.01 MORBID (SEVERE) OBESITY DUE TO EXCESS CALORIES: ICD-10-CM

## 2019-01-09 ENCOUNTER — OUTPATIENT (OUTPATIENT)
Dept: OUTPATIENT SERVICES | Facility: HOSPITAL | Age: 39
LOS: 1 days | End: 2019-01-09
Payer: MEDICAID

## 2019-01-09 ENCOUNTER — RESULT CHARGE (OUTPATIENT)
Age: 39
End: 2019-01-09

## 2019-01-09 ENCOUNTER — APPOINTMENT (OUTPATIENT)
Dept: FAMILY MEDICINE | Facility: HOSPITAL | Age: 39
End: 2019-01-09

## 2019-01-09 VITALS
SYSTOLIC BLOOD PRESSURE: 156 MMHG | WEIGHT: 315 LBS | RESPIRATION RATE: 14 BRPM | BODY MASS INDEX: 61.88 KG/M2 | DIASTOLIC BLOOD PRESSURE: 101 MMHG | TEMPERATURE: 97.7 F | OXYGEN SATURATION: 96 % | HEART RATE: 79 BPM

## 2019-01-09 VITALS — DIASTOLIC BLOOD PRESSURE: 100 MMHG | SYSTOLIC BLOOD PRESSURE: 140 MMHG

## 2019-01-09 DIAGNOSIS — R30.0 DYSURIA: ICD-10-CM

## 2019-01-09 DIAGNOSIS — Z00.00 ENCOUNTER FOR GENERAL ADULT MEDICAL EXAMINATION WITHOUT ABNORMAL FINDINGS: ICD-10-CM

## 2019-01-09 DIAGNOSIS — E66.01 MORBID (SEVERE) OBESITY DUE TO EXCESS CALORIES: ICD-10-CM

## 2019-01-09 DIAGNOSIS — I10 ESSENTIAL (PRIMARY) HYPERTENSION: ICD-10-CM

## 2019-01-09 DIAGNOSIS — Z90.49 ACQUIRED ABSENCE OF OTHER SPECIFIED PARTS OF DIGESTIVE TRACT: Chronic | ICD-10-CM

## 2019-01-09 LAB
BILIRUB UR QL STRIP: NEGATIVE
CLARITY UR: CLEAR
COLLECTION METHOD: NORMAL
GLUCOSE UR-MCNC: NEGATIVE
HCG UR QL: 0.2 EU/DL
HGB UR QL STRIP.AUTO: NORMAL
KETONES UR-MCNC: NEGATIVE
LEUKOCYTE ESTERASE UR QL STRIP: NEGATIVE
NITRITE UR QL STRIP: NEGATIVE
PH UR STRIP: 5.5
PROT UR STRIP-MCNC: NORMAL
SP GR UR STRIP: 1.01

## 2019-01-09 PROCEDURE — G0463: CPT

## 2019-01-09 NOTE — HISTORY OF PRESENT ILLNESS
[FreeTextEntry1] : Weight Check [de-identified] : 38 year old male with PMH of Morbid obesity is here for a weight check. Pt is in the process of scheduling bariatric surgery and he needs to be weighed every month. Pt has been on a low carb diet and has been walking more than usual daily. Today weight is 413 lbs. Feels well.  States he has mild dysuria for the past 2 days, no penile discharge or lesions, no new partners. Denies any complaints. No Fever, Chills, Nausea, Vomiting, Diarrhea, Headache, Chest Pain, Shortness of breath or Abdominal pain.\par

## 2019-01-09 NOTE — ASSESSMENT
[FreeTextEntry1] : # Morbid Obesity\par - Cont current diet and exercise regimen\par - rtc in 1 month for weight check\par - f/u with Surgery, Pulmonology, Cardiology and Psych\par \par # Dysuria\par - UA WNL\par \par

## 2019-01-09 NOTE — REVIEW OF SYSTEMS
[Dysuria] : dysuria [Negative] : Heme/Lymph [Incontinence] : no incontinence [Hematuria] : no hematuria [Frequency] : no frequency

## 2019-01-09 NOTE — HEALTH RISK ASSESSMENT
[No falls in past year] : Patient reported no falls in the past year [0] : 2) Feeling down, depressed, or hopeless: Not at all (0) [] : No [[id number]] : 0

## 2019-01-16 ENCOUNTER — OUTPATIENT (OUTPATIENT)
Dept: OUTPATIENT SERVICES | Facility: HOSPITAL | Age: 39
LOS: 1 days | End: 2019-01-16
Payer: MEDICAID

## 2019-01-16 ENCOUNTER — APPOINTMENT (OUTPATIENT)
Dept: FAMILY MEDICINE | Facility: HOSPITAL | Age: 39
End: 2019-01-16

## 2019-01-16 VITALS
TEMPERATURE: 98.1 F | SYSTOLIC BLOOD PRESSURE: 145 MMHG | OXYGEN SATURATION: 96 % | HEART RATE: 72 BPM | DIASTOLIC BLOOD PRESSURE: 91 MMHG | RESPIRATION RATE: 14 BRPM

## 2019-01-16 DIAGNOSIS — Z87.898 PERSONAL HISTORY OF OTHER SPECIFIED CONDITIONS: ICD-10-CM

## 2019-01-16 DIAGNOSIS — Z00.00 ENCOUNTER FOR GENERAL ADULT MEDICAL EXAMINATION WITHOUT ABNORMAL FINDINGS: ICD-10-CM

## 2019-01-16 DIAGNOSIS — Z90.49 ACQUIRED ABSENCE OF OTHER SPECIFIED PARTS OF DIGESTIVE TRACT: Chronic | ICD-10-CM

## 2019-01-16 DIAGNOSIS — Z13.1 ENCOUNTER FOR SCREENING FOR DIABETES MELLITUS: ICD-10-CM

## 2019-01-16 DIAGNOSIS — Z13.220 ENCOUNTER FOR SCREENING FOR LIPOID DISORDERS: ICD-10-CM

## 2019-01-16 DIAGNOSIS — R51 HEADACHE: ICD-10-CM

## 2019-01-16 PROCEDURE — G0463: CPT

## 2019-01-16 RX ORDER — NAPROXEN SODIUM 220 MG
TABLET ORAL
Refills: 0 | Status: DISCONTINUED | COMMUNITY
End: 2019-01-16

## 2019-01-16 NOTE — COUNSELING
[Weight management counseling provided] : Weight management [ - Behavioral Counseling for Obesity (Face-to-Face for 15 Minutes)] : Behavioral Counseling for Obesity (Face-to-Face for 15 Minutes)

## 2019-01-17 DIAGNOSIS — I10 ESSENTIAL (PRIMARY) HYPERTENSION: ICD-10-CM

## 2019-01-17 NOTE — PHYSICAL EXAM

## 2019-01-17 NOTE — HISTORY OF PRESENT ILLNESS
[de-identified] : 37 YO M with PMHx of Morbid obesity, YISSEL, HTN, presenting for BP check.  patient reports that he has been compliant with is medications and low salt diet, has also started to do more cardio.  Denies headaches or visual changes.

## 2019-01-17 NOTE — ASSESSMENT
[FreeTextEntry1] : #HTN\par - 145/91 today\par - Patient has been compliant with medications and low salt diet, attempting to do more cardio\par - Cont Enalapril 20mg daily\par - Change HCTZ 25mg daily to Chlorthalidone 25mg daily\par - RTC in 2 weeks for BP check, if still elevated would recommend starting Norvasc 5mg daily\par - Diet and exercise were encouraged.\par \par RTC in 2 week for BP check

## 2019-04-08 ENCOUNTER — EMERGENCY (EMERGENCY)
Facility: HOSPITAL | Age: 39
LOS: 0 days | Discharge: HOME | End: 2019-04-08
Attending: EMERGENCY MEDICINE | Admitting: EMERGENCY MEDICINE
Payer: MEDICAID

## 2019-04-08 VITALS — WEIGHT: 315 LBS | HEIGHT: 70 IN

## 2019-04-08 VITALS
DIASTOLIC BLOOD PRESSURE: 82 MMHG | TEMPERATURE: 99 F | OXYGEN SATURATION: 96 % | RESPIRATION RATE: 20 BRPM | HEART RATE: 78 BPM | SYSTOLIC BLOOD PRESSURE: 148 MMHG

## 2019-04-08 DIAGNOSIS — Z88.5 ALLERGY STATUS TO NARCOTIC AGENT: ICD-10-CM

## 2019-04-08 DIAGNOSIS — M54.12 RADICULOPATHY, CERVICAL REGION: ICD-10-CM

## 2019-04-08 DIAGNOSIS — Z88.0 ALLERGY STATUS TO PENICILLIN: ICD-10-CM

## 2019-04-08 DIAGNOSIS — M25.511 PAIN IN RIGHT SHOULDER: ICD-10-CM

## 2019-04-08 DIAGNOSIS — I10 ESSENTIAL (PRIMARY) HYPERTENSION: ICD-10-CM

## 2019-04-08 DIAGNOSIS — Z90.49 ACQUIRED ABSENCE OF OTHER SPECIFIED PARTS OF DIGESTIVE TRACT: Chronic | ICD-10-CM

## 2019-04-08 DIAGNOSIS — M54.2 CERVICALGIA: ICD-10-CM

## 2019-04-08 PROCEDURE — 99284 EMERGENCY DEPT VISIT MOD MDM: CPT

## 2019-04-08 PROCEDURE — 72125 CT NECK SPINE W/O DYE: CPT | Mod: 26

## 2019-04-08 RX ORDER — OXYCODONE AND ACETAMINOPHEN 5; 325 MG/1; MG/1
1 TABLET ORAL ONCE
Qty: 0 | Refills: 0 | Status: DISCONTINUED | OUTPATIENT
Start: 2019-04-08 | End: 2019-04-08

## 2019-04-08 RX ORDER — GABAPENTIN 400 MG/1
1 CAPSULE ORAL
Qty: 14 | Refills: 0
Start: 2019-04-08 | End: 2019-04-21

## 2019-04-08 RX ORDER — CYCLOBENZAPRINE HYDROCHLORIDE 10 MG/1
10 TABLET, FILM COATED ORAL ONCE
Qty: 0 | Refills: 0 | Status: COMPLETED | OUTPATIENT
Start: 2019-04-08 | End: 2019-04-08

## 2019-04-08 RX ORDER — GABAPENTIN 400 MG/1
300 CAPSULE ORAL ONCE
Qty: 0 | Refills: 0 | Status: COMPLETED | OUTPATIENT
Start: 2019-04-08 | End: 2019-04-08

## 2019-04-08 RX ORDER — KETOROLAC TROMETHAMINE 30 MG/ML
30 SYRINGE (ML) INJECTION ONCE
Qty: 0 | Refills: 0 | Status: DISCONTINUED | OUTPATIENT
Start: 2019-04-08 | End: 2019-04-08

## 2019-04-08 RX ORDER — IBUPROFEN 200 MG
1 TABLET ORAL
Qty: 15 | Refills: 0
Start: 2019-04-08 | End: 2019-04-12

## 2019-04-08 RX ADMIN — Medication 30 MILLIGRAM(S): at 19:07

## 2019-04-08 RX ADMIN — GABAPENTIN 300 MILLIGRAM(S): 400 CAPSULE ORAL at 20:28

## 2019-04-08 RX ADMIN — OXYCODONE AND ACETAMINOPHEN 1 TABLET(S): 5; 325 TABLET ORAL at 20:28

## 2019-04-08 RX ADMIN — CYCLOBENZAPRINE HYDROCHLORIDE 10 MILLIGRAM(S): 10 TABLET, FILM COATED ORAL at 19:08

## 2019-04-08 NOTE — ED ADULT NURSE NOTE - CHIEF COMPLAINT QUOTE
Patient states he had shoulder pain that started on thursday and became worse on saturday, today pain escalated to 10/10.  Pain originates in his neck through his shoulder and down his arm on the right side.  He has mobility of the arm,  numbness in the arm down into the hand that has become progressively worse since saturday.  Patient has been using tylenol and motrin, hot patches and icy hot for relief, but has not been effective.  patient had similar event one year ago.  He denies trauma to the neck or arm.  states right forearm is swollen.  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  +nausea  last dose of tylenol at 2pm

## 2019-04-08 NOTE — ED STATDOCS - OBJECTIVE STATEMENT
38 y/o male with a PMHx of HTN, s/p cholecystectomy presents to the ED c/o worsening right fingers, shoulder, neck, arm, wrist pain/numbness x 4 days. Pt has been taking Tylenol, Motrin, ice for his pain with minimal relief, last dose of Tylenol was at 2pm. Denies any trauma to the area. Pt states that he was seen in [facility] sometime last year for similar symptoms. Pt was given Gabapentin for his pain. 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

## 2019-04-08 NOTE — ED STATDOCS - PROGRESS NOTE DETAILS
KELLIE Biggs:   Patient has been seen, evaluated and orders have been written by the attending in intake. Patient is stable.  I will follow up the results of orders written and I will continue to evaluate/observe the patient.   Pennie Biggs PA-C CT with cervical spondylosis, degenerative changes at C5-6, C6-7, C7-1.  Pt reports no pain relief s/p meds in the eD.  Discussed with Dr. Espinoza.  Will add PO Percocet and Gabapentin.  Pt reports nausea s/p morphine in the past, but will take Percocet to try to relieve the pain.   Pennie Biggs PA-C

## 2019-04-08 NOTE — ED ADULT NURSE NOTE - CHPI ED NUR SYMPTOMS NEG
no nausea/no chills/no dizziness/no fever/no decreased eating/drinking/no weakness/no tingling/no vomiting

## 2019-04-08 NOTE — ED ADULT NURSE NOTE - NSFALLRSKOUTCOME_ED_ALL_ED
Universal Safety Interventions
Complete rotator cuff or rupture of left shoulder/Impingement syndrome of left shoulder

## 2019-04-08 NOTE — ED ADULT TRIAGE NOTE - CHIEF COMPLAINT QUOTE
Patient states he had shoulder pain that started on thursday and became worse on saturday, today pain escalated to 10/10.  Pain originates in his neck through his shoulder and down his arm on the right side.  He has mobility of the arm, ha numbness in the arm down into the hand that has become progressively worse since saturday.  Patient has been using tylenol and motrin, hot patches and icy hot for relief, but has not been effective.  patient had similar event one year ago.  He denies trauma to the neck or arm.  right forearm swollen.  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  +nausea  last dose of tylenol at 2pm Patient states he had shoulder pain that started on thursday and became worse on saturday, today pain escalated to 10/10.  Pain originates in his neck through his shoulder and down his arm on the right side.  He has mobility of the arm,  numbness in the arm down into the hand that has become progressively worse since saturday.  Patient has been using tylenol and motrin, hot patches and icy hot for relief, but has not been effective.  patient had similar event one year ago.  He denies trauma to the neck or arm.  states right forearm is swollen.  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  +nausea  last dose of tylenol at 2pm

## 2019-04-08 NOTE — ED STATDOCS - CLINICAL SUMMARY MEDICAL DECISION MAKING FREE TEXT BOX
Pt with radiculopathy, with cervical degenerative disease.  Given meds for symptomatic care.  Okay for d/c home, f/u with specialist as outpatient.

## 2019-04-08 NOTE — ED STATDOCS - CARE PLAN
Principal Discharge DX:	Cervical radiculopathy  Secondary Diagnosis:	Neck pain  Secondary Diagnosis:	Acute pain of right shoulder

## 2019-04-16 ENCOUNTER — APPOINTMENT (OUTPATIENT)
Age: 39
End: 2019-04-16
Payer: MEDICAID

## 2019-04-16 VITALS
RESPIRATION RATE: 14 BRPM | HEART RATE: 90 BPM | SYSTOLIC BLOOD PRESSURE: 117 MMHG | DIASTOLIC BLOOD PRESSURE: 116 MMHG | WEIGHT: 315 LBS | TEMPERATURE: 98 F | BODY MASS INDEX: 45.61 KG/M2 | HEIGHT: 69.61 IN

## 2019-04-16 DIAGNOSIS — I10 ESSENTIAL (PRIMARY) HYPERTENSION: ICD-10-CM

## 2019-04-16 DIAGNOSIS — G47.30 SLEEP APNEA, UNSPECIFIED: ICD-10-CM

## 2019-04-16 PROCEDURE — 99204 OFFICE O/P NEW MOD 45 MIN: CPT

## 2019-04-16 NOTE — HISTORY OF PRESENT ILLNESS
[Other: ___] : [unfilled] [FreeTextEntry1] : right sided neck pain, right shoulder pain radiating down arm, tingling forearm into hand, pinky and ring finger numb
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
## 2019-04-16 NOTE — REASON FOR VISIT
[New Patient Visit] : a new patient visit [Spouse] : spouse [FreeTextEntry1] : PT was seen in  ER 04/08/2019 due to pain CT of cervical performed suggested MRI of cervical on follow up

## 2019-04-16 NOTE — REVIEW OF SYSTEMS
[Feeling Poorly] : feeling poorly [Sleep Disturbances] : sleep disturbances [Feeling Tired] : feeling tired [Hand Weakness] :  hand weakness [Numbness] : numbness [Tingling] : tingling [Eye Pain] : eye pain [As Noted in HPI] : as noted in HPI [Negative] : Heme/Lymph

## 2019-05-01 ENCOUNTER — APPOINTMENT (OUTPATIENT)
Dept: NEUROLOGY | Facility: CLINIC | Age: 39
End: 2019-05-01
Payer: MEDICAID

## 2019-05-01 PROCEDURE — 95910 NRV CNDJ TEST 7-8 STUDIES: CPT

## 2019-05-01 PROCEDURE — 95886 MUSC TEST DONE W/N TEST COMP: CPT

## 2019-05-03 ENCOUNTER — FORM ENCOUNTER (OUTPATIENT)
Age: 39
End: 2019-05-03

## 2019-05-04 ENCOUNTER — APPOINTMENT (OUTPATIENT)
Dept: MRI IMAGING | Facility: CLINIC | Age: 39
End: 2019-05-04
Payer: MEDICAID

## 2019-05-04 ENCOUNTER — OUTPATIENT (OUTPATIENT)
Dept: OUTPATIENT SERVICES | Facility: HOSPITAL | Age: 39
LOS: 1 days | End: 2019-05-04
Payer: MEDICAID

## 2019-05-04 DIAGNOSIS — Z90.49 ACQUIRED ABSENCE OF OTHER SPECIFIED PARTS OF DIGESTIVE TRACT: Chronic | ICD-10-CM

## 2019-05-04 DIAGNOSIS — Z00.8 ENCOUNTER FOR OTHER GENERAL EXAMINATION: ICD-10-CM

## 2019-05-04 PROCEDURE — 72141 MRI NECK SPINE W/O DYE: CPT | Mod: 26

## 2019-05-04 PROCEDURE — 72141 MRI NECK SPINE W/O DYE: CPT

## 2019-05-09 ENCOUNTER — APPOINTMENT (OUTPATIENT)
Dept: NEUROSURGERY | Facility: CLINIC | Age: 39
End: 2019-05-09
Payer: MEDICAID

## 2019-05-09 VITALS
DIASTOLIC BLOOD PRESSURE: 107 MMHG | TEMPERATURE: 97.4 F | WEIGHT: 315 LBS | SYSTOLIC BLOOD PRESSURE: 159 MMHG | HEIGHT: 69.61 IN | RESPIRATION RATE: 14 BRPM | HEART RATE: 73 BPM | BODY MASS INDEX: 45.61 KG/M2

## 2019-05-09 DIAGNOSIS — M50.10 CERVICAL DISC DISORDER WITH RADICULOPATHY, UNSPECIFIED CERVICAL REGION: ICD-10-CM

## 2019-05-09 DIAGNOSIS — G56.21 LESION OF ULNAR NERVE, RIGHT UPPER LIMB: ICD-10-CM

## 2019-05-09 PROCEDURE — 99214 OFFICE O/P EST MOD 30 MIN: CPT

## 2019-05-09 NOTE — CONSULT LETTER
[Dear  ___] : Dear  [unfilled], [Sincerely,] : Sincerely, [FreeTextEntry2] : Claudia Freeman MD\par [address] [facility]\[place] [address]  [Courtesy Letter:] : I had the pleasure of seeing your patient, [unfilled], in my office today. [Referral Closing:] : Thank you very much for seeing this patient.  If you have any questions, please do not hesitate to contact me. [FreeTextEntry1] : Mr. Ha is a very pleasant 39-year-old male patient who was seen in our office today in regards to his cervical radiculopathy on the right. The patient returned today to review his imaging and EMG/nerve conduction studies.\par \par I am happy to report that the patient's pain is significantly improved over the last 2 weeks since we saw him last. Currently, the patient states that his pain ranges from a 2-3/10 in severity. The patient is still taking gabapentin semi-regularly. The patient takes 2-4 tablets of gabapentin daily as needed. Although the patient's definitely improved, the patient continues to have numbness along the dermatomal distribution of C8.\par \par On examination, the patient is alert, oriented, and compliant with the exam. The patient has full neck range of motion and demonstrates 5/5 strength with shoulder abduction, no flexion, elbow extension, wrist extension, finger flexion, and finger abduction. The patient does not have a Andrade sign in either hand.\par \par The patient is accompanied with an MRI scan of the cervical spine dated May 4, 2019. This scan demonstrates an acute C7/T1 disc herniation on the right side with compression of the nerve root. An EMG/nerve conduction study demonstrates mild slowing of the median nerve on the right side with evidence of an acute radiculopathy.\par \par Taken together, the patient has a clinical history and radiographic findings consistent with a C8 radiculopathy on the right secondary to a cervical disc herniation at C7/T1. However, the patient is improving with conservative treatment and I explained to the patient that, in most cases, symptoms from an acute disc herniation resolve completely without surgical treatment. Given that the patient has seen significant improvement in his pain already within the month, I am hopeful that he may avoid the need for surgery completely. I have briefly discussed with the patient the possibility of surgery should conservative managements fail which, in his case would be an endoscopic posterior cervical foraminotomy and discectomy given the patient's anatomy. We also went over the electrophysiologic findings of carpal tunnel syndrome on the right side, but given that the patient does not have any symptoms consistent with carpal tunnel syndrome surgical intervention is not warranted here. Finally, I have instructed the patient to wean himself off the gabapentin, but to contact our office should he need a refill on his prescription. I do not have any regularly scheduled followup with the patient at this time, but would be happy to see him back in our office should the need arise. [FreeTextEntry3] : \par Clovis Otero MD, PhD, FRCSC, FAANS\par \par Attending Neurosurgeon\par [facility] [place]\par  of Neurosurgery\par [facility] at \[facility]\""/[facility]\par \par [address]\[place] [address]\par \par Office: [phone]\par Fax: [phone]\par Email: [email or web address]\par

## 2019-05-09 NOTE — REASON FOR VISIT
[Follow-Up: _____] : a [unfilled] follow-up visit [FreeTextEntry1] : Pt is here to review imaging in [facility] and emg test

## 2019-06-06 ENCOUNTER — APPOINTMENT (OUTPATIENT)
Dept: CARDIOLOGY | Facility: CLINIC | Age: 39
End: 2019-06-06

## 2019-06-20 ENCOUNTER — NON-APPOINTMENT (OUTPATIENT)
Age: 39
End: 2019-06-20

## 2019-06-20 ENCOUNTER — APPOINTMENT (OUTPATIENT)
Dept: CARDIOLOGY | Facility: CLINIC | Age: 39
End: 2019-06-20
Payer: MEDICAID

## 2019-06-20 VITALS
RESPIRATION RATE: 16 BRPM | OXYGEN SATURATION: 97 % | HEART RATE: 91 BPM | BODY MASS INDEX: 46.65 KG/M2 | TEMPERATURE: 98 F | HEIGHT: 69 IN | WEIGHT: 315 LBS

## 2019-06-20 PROCEDURE — 93000 ELECTROCARDIOGRAM COMPLETE: CPT

## 2019-06-20 PROCEDURE — 99204 OFFICE O/P NEW MOD 45 MIN: CPT | Mod: 25

## 2019-06-20 RX ORDER — CHLORTHALIDONE 25 MG/1
25 TABLET ORAL DAILY
Qty: 30 | Refills: 0 | Status: DISCONTINUED | COMMUNITY
Start: 2019-01-16 | End: 2019-06-20

## 2019-06-20 RX ORDER — GABAPENTIN 300 MG/1
300 CAPSULE ORAL 3 TIMES DAILY
Qty: 90 | Refills: 2 | Status: DISCONTINUED | COMMUNITY
Start: 2019-04-16 | End: 2019-06-20

## 2019-06-20 NOTE — HISTORY OF PRESENT ILLNESS
[FreeTextEntry1] : 38 yo male presents for evaluation of hypertensive regimen. Pt has seen Bariatric Surgery at [facility] and is contemplating surgery. He is morbidly obese and is trying to lose weight. Pt denies chest pain or shortness of breath. He denies exertional symptoms. Medications were reviewed.

## 2019-06-20 NOTE — PHYSICAL EXAM
[General Appearance - Well Developed] : well developed [No Deformities] : no deformities [Well Groomed] : well groomed [FreeTextEntry1] : morbidly obese [General Appearance - In No Acute Distress] : no acute distress [Normal Conjunctiva] : the conjunctiva exhibited no abnormalities [Eyelids - No Xanthelasma] : the eyelids demonstrated no xanthelasmas [Normal Oral Mucosa] : normal oral mucosa [No Oral Pallor] : no oral pallor [No Oral Cyanosis] : no oral cyanosis [Normal Jugular Venous V Waves Present] : normal jugular venous V waves present [No Jugular Venous Whitehead A Waves] : no jugular venous whitehead A waves [Normal Jugular Venous A Waves Present] : normal jugular venous A waves present [Heart Sounds] : normal S1 and S2 [Heart Rate And Rhythm] : heart rate and rhythm were normal [Murmurs] : no murmurs present [Exaggerated Use Of Accessory Muscles For Inspiration] : no accessory muscle use [Respiration, Rhythm And Depth] : normal respiratory rhythm and effort [Auscultation Breath Sounds / Voice Sounds] : lungs were clear to auscultation bilaterally [Abdomen Soft] : soft [Abdomen Tenderness] : non-tender [Abdomen Mass (___ Cm)] : no abdominal mass palpated [Abnormal Walk] : normal gait [Gait - Sufficient For Exercise Testing] : the gait was sufficient for exercise testing [Nail Clubbing] : no clubbing of the fingernails [Cyanosis, Localized] : no localized cyanosis [Petechial Hemorrhages (___cm)] : no petechial hemorrhages [Skin Color & Pigmentation] : normal skin color and pigmentation [No Venous Stasis] : no venous stasis [Skin Lesions] : no skin lesions [] : no rash [No Xanthoma] : no  xanthoma was observed [No Skin Ulcers] : no skin ulcer [Affect] : the affect was normal [Oriented To Time, Place, And Person] : oriented to person, place, and time [No Anxiety] : not feeling anxious [Mood] : the mood was normal

## 2019-07-07 ENCOUNTER — TRANSCRIPTION ENCOUNTER (OUTPATIENT)
Age: 39
End: 2019-07-07

## 2019-07-11 ENCOUNTER — APPOINTMENT (OUTPATIENT)
Dept: CARDIOLOGY | Facility: CLINIC | Age: 39
End: 2019-07-11
Payer: MEDICAID

## 2019-07-11 PROCEDURE — 93306 TTE W/DOPPLER COMPLETE: CPT

## 2019-07-15 ENCOUNTER — APPOINTMENT (OUTPATIENT)
Dept: INTERNAL MEDICINE | Facility: CLINIC | Age: 39
End: 2019-07-15
Payer: MEDICAID

## 2019-07-15 VITALS
HEIGHT: 69 IN | WEIGHT: 315 LBS | DIASTOLIC BLOOD PRESSURE: 82 MMHG | SYSTOLIC BLOOD PRESSURE: 136 MMHG | TEMPERATURE: 99.1 F | HEART RATE: 92 BPM | RESPIRATION RATE: 18 BRPM | BODY MASS INDEX: 46.65 KG/M2 | OXYGEN SATURATION: 96 %

## 2019-07-15 PROCEDURE — 99204 OFFICE O/P NEW MOD 45 MIN: CPT

## 2019-07-15 NOTE — ASSESSMENT
[FreeTextEntry1] : #1 morbid obesity. BMI 60.99.  patient was counseled on obesity and instructed on weight reduction diet. I recommended low-fat, low-cholesterol, portion control diet, aiming to lose 2 pounds per week. time spent on counseling counseling on obesity and instructions on diet was 16 minutes.\par \par #2 obstructive sleep apnea. Strict weight reduction diet. Avoid alcohol and sedating medicines. No driving or working with machinery if having any tiredness or sleepiness. Continuing CPAP every evening.\par \par #3 HTN. Strict diet, exercise, BOBBY. (also, avoid presalted foods). Hydrochlorothiazide, enalapril. Return visit in one month to weigh patient and recheck blood pressure.\par \par #4 HM: For fasting blood work.\par

## 2019-07-15 NOTE — HISTORY OF PRESENT ILLNESS
[FreeTextEntry1] : 1st visit. [de-identified] : This is an initial medicine visit here for this 39-year-old male with obesity, obstructive sleep apnea, and hypertension. He is currently working on weight reduction I would like to have bariatric surgery in the future. He denies recent chest pain, lightheadedness, or syncope.\par \par For his sleep apnea he is maintained on CPAP every night which is set at 18 cm of water.  He tells me he is wearing his CPAP throughout the night every night. \par \par He works in [employer].

## 2019-07-15 NOTE — PHYSICAL EXAM
[No Acute Distress] : no acute distress [Well Nourished] : well nourished [Well Developed] : well developed [Well-Appearing] : well-appearing [Normal Sclera/Conjunctiva] : normal sclera/conjunctiva [PERRL] : pupils equal round and reactive to light [Normal Outer Ear/Nose] : the outer ears and nose were normal in appearance [Normal Oropharynx] : the oropharynx was normal [No JVD] : no jugular venous distention [No Lymphadenopathy] : no lymphadenopathy [Supple] : supple [No Respiratory Distress] : no respiratory distress  [No Accessory Muscle Use] : no accessory muscle use [Clear to Auscultation] : lungs were clear to auscultation bilaterally [Normal Rate] : normal rate  [Regular Rhythm] : with a regular rhythm [Normal S1, S2] : normal S1 and S2 [No Edema] : there was no peripheral edema [No Extremity Clubbing/Cyanosis] : no extremity clubbing/cyanosis [Soft] : abdomen soft [Non Tender] : non-tender [Non-distended] : non-distended [No HSM] : no HSM [Normal Bowel Sounds] : normal bowel sounds [Normal Anterior Cervical Nodes] : no anterior cervical lymphadenopathy [No Rash] : no rash [No Focal Deficits] : no focal deficits [Normal Gait] : normal gait [Normal Affect] : the affect was normal [Normal Insight/Judgement] : insight and judgment were intact [de-identified] : obese

## 2019-07-15 NOTE — COUNSELING
[Weight management counseling provided] : Weight management [Healthy eating counseling provided] : healthy eating [Activity counseling provided] : activity [Low Fat Diet] : Low fat diet [Low Salt Diet] : Low salt diet [Decrease Portions] : Decrease food portions [___ min/wk activity recommended] : [unfilled] min/wk activity recommended [Walking] : Walking [de-identified] : no concentrated sweets.

## 2019-07-16 ENCOUNTER — APPOINTMENT (OUTPATIENT)
Dept: BARIATRICS | Facility: CLINIC | Age: 39
End: 2019-07-16
Payer: MEDICAID

## 2019-07-16 VITALS
HEIGHT: 68 IN | SYSTOLIC BLOOD PRESSURE: 140 MMHG | BODY MASS INDEX: 47.74 KG/M2 | HEART RATE: 77 BPM | DIASTOLIC BLOOD PRESSURE: 90 MMHG | WEIGHT: 315 LBS | OXYGEN SATURATION: 97 %

## 2019-07-16 PROCEDURE — 99215 OFFICE O/P EST HI 40 MIN: CPT

## 2019-07-16 RX ORDER — ENALAPRIL MALEATE 10 MG/1
10 TABLET ORAL DAILY
Refills: 0 | Status: COMPLETED | COMMUNITY
End: 2019-07-16

## 2019-07-16 RX ORDER — HYDROCHLOROTHIAZIDE 12.5 MG/1
12.5 TABLET ORAL
Refills: 0 | Status: COMPLETED | COMMUNITY
End: 2019-07-16

## 2019-07-24 ENCOUNTER — APPOINTMENT (OUTPATIENT)
Dept: CARDIOLOGY | Facility: CLINIC | Age: 39
End: 2019-07-24
Payer: MEDICAID

## 2019-07-24 PROCEDURE — 93015 CV STRESS TEST SUPVJ I&R: CPT

## 2019-07-29 LAB
25(OH)D3 SERPL-MCNC: 17.3 NG/ML
ALBUMIN SERPL ELPH-MCNC: 4.1 G/DL
ALP BLD-CCNC: 86 U/L
ALT SERPL-CCNC: 20 U/L
ANION GAP SERPL CALC-SCNC: 12 MMOL/L
AST SERPL-CCNC: 16 U/L
BASOPHILS # BLD AUTO: 0.05 K/UL
BASOPHILS NFR BLD AUTO: 0.6 %
BILIRUB SERPL-MCNC: 0.6 MG/DL
BUN SERPL-MCNC: 17 MG/DL
CALCIUM SERPL-MCNC: 9.5 MG/DL
CHLORIDE SERPL-SCNC: 104 MMOL/L
CHOLEST SERPL-MCNC: 170 MG/DL
CHOLEST/HDLC SERPL: 3.9 RATIO
CO2 SERPL-SCNC: 28 MMOL/L
CREAT SERPL-MCNC: 0.77 MG/DL
EOSINOPHIL # BLD AUTO: 0.11 K/UL
EOSINOPHIL NFR BLD AUTO: 1.3 %
ESTIMATED AVERAGE GLUCOSE: 94 MG/DL
FOLATE SERPL-MCNC: 13.3 NG/ML
GLUCOSE SERPL-MCNC: 101 MG/DL
HBA1C MFR BLD HPLC: 4.9 %
HCT VFR BLD CALC: 41.7 %
HDLC SERPL-MCNC: 44 MG/DL
HGB BLD-MCNC: 13.1 G/DL
IMM GRANULOCYTES NFR BLD AUTO: 0.5 %
IRON SATN MFR SERPL: 34 %
IRON SERPL-MCNC: 89 UG/DL
LDLC SERPL CALC-MCNC: 98 MG/DL
LYMPHOCYTES # BLD AUTO: 2.25 K/UL
LYMPHOCYTES NFR BLD AUTO: 27.2 %
MAN DIFF?: NORMAL
MCHC RBC-ENTMCNC: 29.5 PG
MCHC RBC-ENTMCNC: 31.4 GM/DL
MCV RBC AUTO: 93.9 FL
MONOCYTES # BLD AUTO: 0.48 K/UL
MONOCYTES NFR BLD AUTO: 5.8 %
NEUTROPHILS # BLD AUTO: 5.35 K/UL
NEUTROPHILS NFR BLD AUTO: 64.6 %
PLATELET # BLD AUTO: 270 K/UL
POTASSIUM SERPL-SCNC: 3.9 MMOL/L
PROT SERPL-MCNC: 7.7 G/DL
RBC # BLD: 4.44 M/UL
RBC # FLD: 12.8 %
SODIUM SERPL-SCNC: 144 MMOL/L
TIBC SERPL-MCNC: 263 UG/DL
TRIGL SERPL-MCNC: 140 MG/DL
TSH SERPL-ACNC: 2.57 UIU/ML
UIBC SERPL-MCNC: 174 UG/DL
VIT B12 SERPL-MCNC: 604 PG/ML
WBC # FLD AUTO: 8.28 K/UL

## 2019-07-31 NOTE — PHYSICAL EXAM
[Obese, well nourished, in no acute distress] : obese, well nourished, in no acute distress [Normal] : grossly intact [de-identified] : obese, soft, nontender, no evidence of hernia

## 2019-07-31 NOTE — HISTORY OF PRESENT ILLNESS
[de-identified] : Patient is a 39 year old man with a long-standing history of morbid obesity, who has attempted numerous weight loss treatments without long term success. Patient is familiar with the Laparoscopic Adjustable Gastric Band, the Laparoscopic Sleeve Gastrectomy and the Laparoscopic Gastric Bypass. Patient had been seen in the past for evaluation for weight loss surgery however patient was unable to proceed with surgery at that time. Patient now realizes that he will be unable to lose and maintain weight loss with diet and exercise alone and presents today for reevaluation and to discuss options for surgery, specifically the Laparoscopic Sleeve Gastrectomy.

## 2019-07-31 NOTE — ASSESSMENT
[FreeTextEntry1] : 38 year old man with long-standing history of morbid obesity presents today to discuss options for weight loss surgery.  I had an extensive discussion with the patient reviewing the Laparoscopic Sleeve Gastrectomy. Diagrams were used. All questions were answered.  \par \par Complications were discussed including but not limited to: vitamin and protein deficiencies, pneumonia, urinary infection, wound infection, leaks/peritonitis possibly requiring intraabdominal drains or reoperation, bleeding, DVT, pulmonary embolus, severe reflux, sleeve obstruction, abdominal wall hernias, revisions, death, inadequate weight loss. The importance of vitamins and protein supplementation was stressed, as was the importance of follow-up and exercise. \par \par Patient encouraged to make dietary and lifestyle changes in preparation for surgery.\par \par Patient with a long history of morbid obesity.He is now ready to pursue the Laparoscopic Sleeve Gastrectomy. He was given written material to review.  Pre-operative evaluations were reviewed. He will be seen again prior to surgery.He was told to call with any questions.

## 2019-08-14 ENCOUNTER — APPOINTMENT (OUTPATIENT)
Dept: INTERNAL MEDICINE | Facility: CLINIC | Age: 39
End: 2019-08-14
Payer: MEDICAID

## 2019-08-14 ENCOUNTER — NON-APPOINTMENT (OUTPATIENT)
Age: 39
End: 2019-08-14

## 2019-08-14 VITALS
WEIGHT: 315 LBS | DIASTOLIC BLOOD PRESSURE: 110 MMHG | TEMPERATURE: 98.6 F | HEART RATE: 94 BPM | SYSTOLIC BLOOD PRESSURE: 152 MMHG | RESPIRATION RATE: 18 BRPM | OXYGEN SATURATION: 97 % | BODY MASS INDEX: 47.74 KG/M2 | HEIGHT: 68 IN

## 2019-08-14 PROCEDURE — 94010 BREATHING CAPACITY TEST: CPT

## 2019-08-14 PROCEDURE — 99214 OFFICE O/P EST MOD 30 MIN: CPT | Mod: 25

## 2019-08-14 NOTE — COUNSELING
[Potential consequences of obesity discussed] : Potential consequences of obesity discussed [Encouraged to increase physical activity] : Encouraged to increase physical activity [Weigh Self Weekly] : weigh self weekly [____ min/wk Activity] : [unfilled] min/wk activity

## 2019-08-14 NOTE — DATA REVIEWED
[FreeTextEntry1] : Spirometry today is within normal limits with an FEV1 of 3.55 and 90% predicted.\par \par Repeat /96 LA large cuff.

## 2019-08-14 NOTE — HISTORY OF PRESENT ILLNESS
[FreeTextEntry1] : RV, YISSEL. [de-identified] : This a return visit for this 39-year-old male with history of severe obstructive sleep apnea, morbid obesity, hypertension. Hewould like to have future bariatric surgery and following with surgery regarding this.  His last sleep study was a split polysomnography/CPAP titration in 2013. I am scheduling him to have another overnight split sleep study. He denies excessive daytime somnolence or having to take naps. He tells me that he is using his CPAP at a level of 18 cm water, every night.\par \par He denies recent coughing, wheezing, dyspnea on exertion.\par \par He is not having recent chest pain, palpitations, lightheadedness, or syncope.

## 2019-08-14 NOTE — ASSESSMENT
[FreeTextEntry1] : #1 morbid obesity. Patient was counseled on a strict, low-fat, low-cholesterol, BOBBY weight reduction diet today. Regular aerobic exercise at least 120 minutes per week.\par \par #2 YISSEL. Continue CPAP at a level of 18 cm of water, every night. The patient's last sleep study was in 2013 and I am scheduling him to have a split polysomnography/CPAP titration study. Patient will continue strict weight reduction efforts, avoiding alcohol and sedating medicines. He knows fully not to drive or work [employer] machinery if having any sleepiness or tiredness.\par \par #3 HTN. HCTZ. enalapril. Reminded again to avoid salty foods and not use salt shaker. Obtain home machine to measure home blood pressure readings 3 times per week.

## 2019-08-14 NOTE — REVIEW OF SYSTEMS
[Fever] : no fever [Chills] : no chills [Palpitations] : no palpitations [Chest Pain] : no chest pain [Wheezing] : no wheezing [Cough] : no cough [Dizziness] : no dizziness [Dyspnea on Exertion] : not dyspnea on exertion [Fainting] : no fainting [Negative] : Psychiatric

## 2019-08-14 NOTE — PHYSICAL EXAM
[No Acute Distress] : no acute distress [Well Developed] : well developed [Well Nourished] : well nourished [Normal Sclera/Conjunctiva] : normal sclera/conjunctiva [PERRL] : pupils equal round and reactive to light [Normal Outer Ear/Nose] : the outer ears and nose were normal in appearance [Normal Oropharynx] : the oropharynx was normal [No JVD] : no jugular venous distention [No Lymphadenopathy] : no lymphadenopathy [Supple] : supple [No Respiratory Distress] : no respiratory distress  [No Accessory Muscle Use] : no accessory muscle use [Clear to Auscultation] : lungs were clear to auscultation bilaterally [Normal Rate] : normal rate  [Regular Rhythm] : with a regular rhythm [Normal S1, S2] : normal S1 and S2 [No Edema] : there was no peripheral edema [No Extremity Clubbing/Cyanosis] : no extremity clubbing/cyanosis [Soft] : abdomen soft [Non Tender] : non-tender [Non-distended] : non-distended [No HSM] : no HSM [Normal Bowel Sounds] : normal bowel sounds [Normal Anterior Cervical Nodes] : no anterior cervical lymphadenopathy [No Rash] : no rash [No Focal Deficits] : no focal deficits [Normal Affect] : the affect was normal [Normal Insight/Judgement] : insight and judgment were intact [de-identified] : obese

## 2019-08-22 ENCOUNTER — APPOINTMENT (OUTPATIENT)
Dept: BARIATRICS/WEIGHT MGMT | Facility: CLINIC | Age: 39
End: 2019-08-22
Payer: COMMERCIAL

## 2019-08-22 VITALS — WEIGHT: 315 LBS | HEIGHT: 68 IN | BODY MASS INDEX: 47.74 KG/M2

## 2019-08-22 DIAGNOSIS — Z87.891 PERSONAL HISTORY OF NICOTINE DEPENDENCE: ICD-10-CM

## 2019-08-22 DIAGNOSIS — Z78.9 OTHER SPECIFIED HEALTH STATUS: ICD-10-CM

## 2019-08-22 PROCEDURE — 90791 PSYCH DIAGNOSTIC EVALUATION: CPT

## 2019-09-12 ENCOUNTER — APPOINTMENT (OUTPATIENT)
Dept: BARIATRICS/WEIGHT MGMT | Facility: CLINIC | Age: 39
End: 2019-09-12
Payer: SELF-PAY

## 2019-09-12 VITALS — HEIGHT: 68 IN | BODY MASS INDEX: 47.74 KG/M2 | WEIGHT: 315 LBS

## 2019-09-12 PROCEDURE — 97802 MEDICAL NUTRITION INDIV IN: CPT

## 2019-09-20 ENCOUNTER — NON-APPOINTMENT (OUTPATIENT)
Age: 39
End: 2019-09-20

## 2019-09-20 ENCOUNTER — APPOINTMENT (OUTPATIENT)
Dept: INTERNAL MEDICINE | Facility: CLINIC | Age: 39
End: 2019-09-20
Payer: MEDICAID

## 2019-09-20 VITALS
TEMPERATURE: 98.3 F | SYSTOLIC BLOOD PRESSURE: 142 MMHG | BODY MASS INDEX: 47.74 KG/M2 | DIASTOLIC BLOOD PRESSURE: 90 MMHG | RESPIRATION RATE: 18 BRPM | OXYGEN SATURATION: 97 % | WEIGHT: 315 LBS | HEIGHT: 68 IN | HEART RATE: 74 BPM

## 2019-09-20 PROCEDURE — 94060 EVALUATION OF WHEEZING: CPT

## 2019-09-20 PROCEDURE — 99214 OFFICE O/P EST MOD 30 MIN: CPT | Mod: 25

## 2019-09-20 NOTE — PHYSICAL EXAM
[No Acute Distress] : no acute distress [Well Nourished] : well nourished [Well Developed] : well developed [Normal Sclera/Conjunctiva] : normal sclera/conjunctiva [PERRL] : pupils equal round and reactive to light [Normal Oropharynx] : the oropharynx was normal [Normal Outer Ear/Nose] : the outer ears and nose were normal in appearance [No JVD] : no jugular venous distention [No Lymphadenopathy] : no lymphadenopathy [Supple] : supple [No Respiratory Distress] : no respiratory distress  [No Accessory Muscle Use] : no accessory muscle use [Clear to Auscultation] : lungs were clear to auscultation bilaterally [Normal Rate] : normal rate  [Regular Rhythm] : with a regular rhythm [Normal S1, S2] : normal S1 and S2 [No Extremity Clubbing/Cyanosis] : no extremity clubbing/cyanosis [Soft] : abdomen soft [Non Tender] : non-tender [Non-distended] : non-distended [No HSM] : no HSM [Normal Bowel Sounds] : normal bowel sounds [Normal Anterior Cervical Nodes] : no anterior cervical lymphadenopathy [No Rash] : no rash [No Focal Deficits] : no focal deficits [Deep Tendon Reflexes (DTR)] : deep tendon reflexes were 2+ and symmetric [Normal Affect] : the affect was normal [Normal Insight/Judgement] : insight and judgment were intact

## 2019-09-20 NOTE — DATA REVIEWED
[FreeTextEntry1] : Spirometry today shows a mild restrictive deficit with an FVC of 3.72 or 75% predicted.

## 2019-09-20 NOTE — COUNSELING
[Potential consequences of obesity discussed] : Potential consequences of obesity discussed [Benefits of weight loss discussed] : Benefits of weight loss discussed [Decrease Portions] : decrease portions

## 2019-09-20 NOTE — ASSESSMENT
[FreeTextEntry1] : #1 morbid obesity. Pt counseled on strict weight reduction diet.\par \par #2 obstructive sleep apnea. Continue CPAP. Continue weight reduction efforts. Patient knows not to drive if having any sleepiness or tiredness. For split polysomnography/CPAP titration study.\par \par #3 HTN. Continue current meds.

## 2019-09-20 NOTE — HISTORY OF PRESENT ILLNESS
[FreeTextEntry1] : RV, morbid obesity. [de-identified] : Return visit for this 39-year-old male with morbid obesity, obstructive sleep apnea. He has not yet had his overnight split polysomnography study. Does continue to wear her CPAP every night at a level of 18 cm water..  States that he wears his C-PAP 6-8 hours each evening. He does not have excessive daytime somnolence or morning headaches. He does take a nap on Sunday when he is off work.\par \par He is not having any coughing, wheezing, dyspnea on exertion .\par \par He is a nonsmoker now and smoked for just a few pack years years ago. He has an infrequent drink.

## 2019-09-20 NOTE — REVIEW OF SYSTEMS
[Wheezing] : no wheezing [Cough] : no cough [Dyspnea on Exertion] : not dyspnea on exertion [Negative] : Heme/Lymph

## 2019-10-21 ENCOUNTER — APPOINTMENT (OUTPATIENT)
Dept: INTERNAL MEDICINE | Facility: CLINIC | Age: 39
End: 2019-10-21
Payer: MEDICAID

## 2019-10-21 VITALS
TEMPERATURE: 97.7 F | HEIGHT: 68 IN | HEART RATE: 87 BPM | BODY MASS INDEX: 47.74 KG/M2 | WEIGHT: 315 LBS | DIASTOLIC BLOOD PRESSURE: 84 MMHG | RESPIRATION RATE: 18 BRPM | SYSTOLIC BLOOD PRESSURE: 130 MMHG | OXYGEN SATURATION: 97 %

## 2019-10-21 PROCEDURE — 99213 OFFICE O/P EST LOW 20 MIN: CPT

## 2019-10-21 NOTE — REVIEW OF SYSTEMS
[Shortness Of Breath] : no shortness of breath [Wheezing] : no wheezing [Cough] : no cough [Dyspnea on Exertion] : not dyspnea on exertion [Negative] : Genitourinary

## 2019-10-21 NOTE — HISTORY OF PRESENT ILLNESS
[de-identified] : Pt here for followup. He has a history of morbid obesity, obstructive sleep apnea. He had a consultation with Dr. Gonzalez and will be having bariatric surgery in the next few months. he will undergo a repeat split night study next week to reassess his CPAP needs. He currently is maintained at 18 cm pressure. He does not have excessive daytime drowsiness or am headaches. \par He denies coughing, wheezing , shortness  of breath. \par He is a non smoker. He is committed to motivation for surgery to improved his health.  [FreeTextEntry1] : F/up

## 2019-10-21 NOTE — PHYSICAL EXAM
[No Acute Distress] : no acute distress [Well Nourished] : well nourished [Well Developed] : well developed [Normal Oropharynx] : the oropharynx was normal [Normal TMs] : both tympanic membranes were normal [No JVD] : no jugular venous distention [No Lymphadenopathy] : no lymphadenopathy [Supple] : supple [No Respiratory Distress] : no respiratory distress  [No Accessory Muscle Use] : no accessory muscle use [Clear to Auscultation] : lungs were clear to auscultation bilaterally [Normal Rate] : normal rate  [Regular Rhythm] : with a regular rhythm [Normal S1, S2] : normal S1 and S2 [Pedal Pulses Present] : the pedal pulses are present [Coordination Grossly Intact] : coordination grossly intact [No Focal Deficits] : no focal deficits [Normal Gait] : normal gait [Normal Affect] : the affect was normal [Alert and Oriented x3] : oriented to person, place, and time [Normal Insight/Judgement] : insight and judgment were intact

## 2019-10-21 NOTE — ASSESSMENT
[FreeTextEntry1] : Continue CPAP. Scheduled for split night / CPAP titration study next week at [place]. \par Continue weight reduction efforts for upcoming bariatric surgery. \par Continue current medications\par F/up 1 month

## 2019-10-23 ENCOUNTER — OUTPATIENT (OUTPATIENT)
Dept: OUTPATIENT SERVICES | Facility: HOSPITAL | Age: 39
LOS: 1 days | End: 2019-10-23
Payer: MEDICAID

## 2019-10-23 ENCOUNTER — APPOINTMENT (OUTPATIENT)
Dept: SLEEP CENTER | Facility: CLINIC | Age: 39
End: 2019-10-23
Payer: MEDICAID

## 2019-10-23 DIAGNOSIS — Z90.49 ACQUIRED ABSENCE OF OTHER SPECIFIED PARTS OF DIGESTIVE TRACT: Chronic | ICD-10-CM

## 2019-10-23 PROCEDURE — 95811 POLYSOM 6/>YRS CPAP 4/> PARM: CPT

## 2019-10-23 PROCEDURE — 95811 POLYSOM 6/>YRS CPAP 4/> PARM: CPT | Mod: 26

## 2019-10-24 ENCOUNTER — APPOINTMENT (OUTPATIENT)
Dept: BARIATRICS/WEIGHT MGMT | Facility: CLINIC | Age: 39
End: 2019-10-24
Payer: SELF-PAY

## 2019-10-24 VITALS — BODY MASS INDEX: 47.74 KG/M2 | HEIGHT: 68 IN | WEIGHT: 315 LBS

## 2019-10-24 DIAGNOSIS — G47.33 OBSTRUCTIVE SLEEP APNEA (ADULT) (PEDIATRIC): ICD-10-CM

## 2019-10-24 PROCEDURE — 97803 MED NUTRITION INDIV SUBSEQ: CPT

## 2019-11-16 ENCOUNTER — TRANSCRIPTION ENCOUNTER (OUTPATIENT)
Age: 39
End: 2019-11-16

## 2019-11-18 ENCOUNTER — APPOINTMENT (OUTPATIENT)
Dept: INTERNAL MEDICINE | Facility: CLINIC | Age: 39
End: 2019-11-18

## 2019-11-20 ENCOUNTER — APPOINTMENT (OUTPATIENT)
Dept: INTERNAL MEDICINE | Facility: CLINIC | Age: 39
End: 2019-11-20

## 2019-11-25 ENCOUNTER — APPOINTMENT (OUTPATIENT)
Dept: INTERNAL MEDICINE | Facility: CLINIC | Age: 39
End: 2019-11-25
Payer: MEDICAID

## 2019-11-25 VITALS
WEIGHT: 315 LBS | SYSTOLIC BLOOD PRESSURE: 132 MMHG | TEMPERATURE: 98.2 F | RESPIRATION RATE: 18 BRPM | HEIGHT: 69 IN | HEART RATE: 98 BPM | OXYGEN SATURATION: 98 % | DIASTOLIC BLOOD PRESSURE: 92 MMHG | BODY MASS INDEX: 46.65 KG/M2

## 2019-11-25 DIAGNOSIS — Z86.69 PERSONAL HISTORY OF OTHER DISEASES OF THE NERVOUS SYSTEM AND SENSE ORGANS: ICD-10-CM

## 2019-11-25 PROCEDURE — ZZZZZ: CPT

## 2019-11-25 NOTE — REVIEW OF SYSTEMS
[Negative] : Neurological [Shortness Of Breath] : no shortness of breath [Wheezing] : no wheezing [Cough] : no cough [Dyspnea on Exertion] : not dyspnea on exertion

## 2019-11-25 NOTE — HISTORY OF PRESENT ILLNESS
[FreeTextEntry1] : F/up  [de-identified] : Pt here for followup/ monthly supervised  weight in.  He has a history of obesity, obstructive sleep apnea. He will be having bariatric surgery in the next few months with Dr. Gonzalez. He had a recent split night sleep study revealed optimal CPAP pressure 12 cm H2O. Prior to CPAP apnea hypopnea was in the severe range . A new CPAP machine was ordered for pt. \par He denies coughing, wheezing ,shortness of breath. \par He is a nonsmoker. Refused influenza injection today. he is committed to motivation for surgery to improve his health.

## 2019-11-25 NOTE — ASSESSMENT
[FreeTextEntry1] : Continue CPAP at 12 cm . New machine ordered \par Continue weight reduction efforts for upcoming bariatric surgery \par refused flu shot today\par Continue current medications \par pt will need pulmonary clearance 30 days prior to surgery with Dr. Lizama \par F/up weight in next month

## 2019-11-26 ENCOUNTER — APPOINTMENT (OUTPATIENT)
Dept: BARIATRICS/WEIGHT MGMT | Facility: CLINIC | Age: 39
End: 2019-11-26

## 2019-12-05 ENCOUNTER — APPOINTMENT (OUTPATIENT)
Dept: CARDIOLOGY | Facility: CLINIC | Age: 39
End: 2019-12-05

## 2019-12-23 ENCOUNTER — APPOINTMENT (OUTPATIENT)
Dept: INTERNAL MEDICINE | Facility: CLINIC | Age: 39
End: 2019-12-23

## 2020-01-23 ENCOUNTER — RESULT REVIEW (OUTPATIENT)
Age: 40
End: 2020-01-23

## 2020-01-23 ENCOUNTER — OUTPATIENT (OUTPATIENT)
Dept: OUTPATIENT SERVICES | Facility: HOSPITAL | Age: 40
LOS: 1 days | Discharge: HOME | End: 2020-01-23
Payer: MEDICAID

## 2020-01-23 VITALS
RESPIRATION RATE: 20 BRPM | WEIGHT: 315 LBS | TEMPERATURE: 97 F | OXYGEN SATURATION: 99 % | HEART RATE: 77 BPM | SYSTOLIC BLOOD PRESSURE: 135 MMHG | DIASTOLIC BLOOD PRESSURE: 80 MMHG | HEIGHT: 69 IN

## 2020-01-23 DIAGNOSIS — E66.01 MORBID (SEVERE) OBESITY DUE TO EXCESS CALORIES: ICD-10-CM

## 2020-01-23 DIAGNOSIS — Z90.49 ACQUIRED ABSENCE OF OTHER SPECIFIED PARTS OF DIGESTIVE TRACT: Chronic | ICD-10-CM

## 2020-01-23 PROCEDURE — 88312 SPECIAL STAINS GROUP 1: CPT | Mod: 26

## 2020-01-23 PROCEDURE — 88305 TISSUE EXAM BY PATHOLOGIST: CPT | Mod: 26

## 2020-01-23 PROCEDURE — 88305 TISSUE EXAM BY PATHOLOGIST: CPT

## 2020-01-23 PROCEDURE — 88312 SPECIAL STAINS GROUP 1: CPT

## 2020-01-28 DIAGNOSIS — Z88.0 ALLERGY STATUS TO PENICILLIN: ICD-10-CM

## 2020-01-28 DIAGNOSIS — E66.01 MORBID (SEVERE) OBESITY DUE TO EXCESS CALORIES: ICD-10-CM

## 2020-01-28 DIAGNOSIS — Z01.818 ENCOUNTER FOR OTHER PREPROCEDURAL EXAMINATION: ICD-10-CM

## 2020-01-28 DIAGNOSIS — Z79.82 LONG TERM (CURRENT) USE OF ASPIRIN: ICD-10-CM

## 2020-01-28 DIAGNOSIS — G47.33 OBSTRUCTIVE SLEEP APNEA (ADULT) (PEDIATRIC): ICD-10-CM

## 2020-01-28 DIAGNOSIS — K29.50 UNSPECIFIED CHRONIC GASTRITIS WITHOUT BLEEDING: ICD-10-CM

## 2020-01-28 DIAGNOSIS — Z87.891 PERSONAL HISTORY OF NICOTINE DEPENDENCE: ICD-10-CM

## 2020-01-28 DIAGNOSIS — Z98.84 BARIATRIC SURGERY STATUS: ICD-10-CM

## 2020-01-28 DIAGNOSIS — I10 ESSENTIAL (PRIMARY) HYPERTENSION: ICD-10-CM

## 2020-01-28 DIAGNOSIS — Z99.89 DEPENDENCE ON OTHER ENABLING MACHINES AND DEVICES: ICD-10-CM

## 2020-02-03 ENCOUNTER — APPOINTMENT (OUTPATIENT)
Dept: INTERNAL MEDICINE | Facility: CLINIC | Age: 40
End: 2020-02-03
Payer: MEDICAID

## 2020-02-03 VITALS
WEIGHT: 315 LBS | SYSTOLIC BLOOD PRESSURE: 126 MMHG | OXYGEN SATURATION: 96 % | TEMPERATURE: 98.7 F | RESPIRATION RATE: 18 BRPM | HEIGHT: 69 IN | HEART RATE: 99 BPM | DIASTOLIC BLOOD PRESSURE: 82 MMHG | BODY MASS INDEX: 46.65 KG/M2

## 2020-02-03 PROCEDURE — 99214 OFFICE O/P EST MOD 30 MIN: CPT

## 2020-02-03 NOTE — COUNSELING
[Benefits of weight loss discussed] : Benefits of weight loss discussed [Decrease Portions] : decrease portions [____ min/wk Activity] : [unfilled] min/wk activity

## 2020-02-03 NOTE — HISTORY OF PRESENT ILLNESS
[TextBox_4] : This is a return visit for this 40-year-old male with a history of morbid obesity, hypertension, and severe obstructive sleep apnea. He had a split polysomnography/CPAP titration study on October 23, 2019 which showed an optimal CPAP level of 12. He tried with his new machine a CPAP level of 12 cm water, however he did not sleep well using it. He went back to using his old machine at a CPAP level of 18 and felt much better using this. On this, he is not having any daytime somnolence or having to take naps. He does awaken feeling refreshed and denies having morning headaches.\par \par He denies recent chest pain, palpitations, or lightheadedness.\par \par Is not having any recent coughing, wheezing, or dyspnea on exertion.\par \par Past surgeries include gallbladder surgery in 2014. There is no history of complication or problem with this anesthesia.

## 2020-02-03 NOTE — PHYSICAL EXAM
[No Acute Distress] : no acute distress [Normal Oropharynx] : normal oropharynx [Normal Appearance] : normal appearance [No Neck Mass] : no neck mass [Normal Rate/Rhythm] : normal rate/rhythm [Normal S1, S2] : normal s1, s2 [No Murmurs] : no murmurs [No Resp Distress] : no resp distress [Clear to Auscultation Bilaterally] : clear to auscultation bilaterally [No Abnormalities] : no abnormalities [Benign] : benign [No Clubbing] : no clubbing [No Cyanosis] : no cyanosis [No Edema] : no edema [Normal Color/ Pigmentation] : normal color/ pigmentation [No Rash] : no rash [No Focal Deficits] : no focal deficits [Normal Insight/judgment] : normal insight/judgment [Normal Affect] : normal affect [TextBox_2] : obese

## 2020-02-03 NOTE — ASSESSMENT
[FreeTextEntry1] : #1  The patient is at some increased but acceptable risk for his planned bariatric surgery from the general medical and pulmonary standpoint. I am prescribing for him to have Auto PAP set at between 6-18 cm of water. He will continue using his old machine set at CPAP level of 18 cm of water, until he gets his new Auto-PAP machine, as he felt much better using this than his new C-PAP machine set at 12 cm of water.  I recommend close monitoring of the patient intraoperatively and postoperatively. I also recommend having oxygen and either CPAP or BiPAP available postoperatively, given the patient's history obstructive sleep apnea.  Pt was instructed to bring his home C-PAP machine (either his old machine set at a C-PAP level of 18 cm of water, or if received before his surgery his new auto-PAP machine (6-18 cm water) to the OR/hospital so it can be used post op if needed and during hs if he stays overnight. Regarding his hypertension, he will take his enalapril and hydrochlorothiazide with just a sip of water the morning of surgery.\par \par #2 morbid obesity.\par \par #3 HTN.

## 2020-02-03 NOTE — REVIEW OF SYSTEMS
[Wheezing] : no wheezing [Cough] : no cough [Chest Discomfort] : no chest discomfort [SOB on Exertion] : no sob on exertion [Syncope] : no syncope [Palpitations] : no palpitations [Dizziness] : no dizziness

## 2020-02-03 NOTE — REVIEW OF SYSTEMS
[Wheezing] : no wheezing [Cough] : no cough [SOB on Exertion] : no sob on exertion [Chest Discomfort] : no chest discomfort [Syncope] : no syncope [Palpitations] : no palpitations [Dizziness] : no dizziness

## 2020-02-03 NOTE — REVIEW OF SYSTEMS
[Cough] : no cough [Wheezing] : no wheezing [Chest Discomfort] : no chest discomfort [SOB on Exertion] : no sob on exertion [Syncope] : no syncope [Palpitations] : no palpitations [Dizziness] : no dizziness

## 2020-02-07 ENCOUNTER — LABORATORY RESULT (OUTPATIENT)
Age: 40
End: 2020-02-07

## 2020-02-17 ENCOUNTER — NON-APPOINTMENT (OUTPATIENT)
Age: 40
End: 2020-02-17

## 2020-02-17 ENCOUNTER — APPOINTMENT (OUTPATIENT)
Dept: CARDIOLOGY | Facility: CLINIC | Age: 40
End: 2020-02-17
Payer: MEDICAID

## 2020-02-17 VITALS
HEIGHT: 69 IN | HEART RATE: 73 BPM | WEIGHT: 315 LBS | DIASTOLIC BLOOD PRESSURE: 76 MMHG | SYSTOLIC BLOOD PRESSURE: 139 MMHG | OXYGEN SATURATION: 97 % | BODY MASS INDEX: 46.65 KG/M2

## 2020-02-17 PROCEDURE — 99214 OFFICE O/P EST MOD 30 MIN: CPT | Mod: 25

## 2020-02-17 PROCEDURE — 93000 ELECTROCARDIOGRAM COMPLETE: CPT

## 2020-02-17 NOTE — HISTORY OF PRESENT ILLNESS
[Preoperative Visit] : for a medical evaluation prior to surgery [Scheduled Procedure ___] : a [unfilled] [Date of Surgery ___] : on [unfilled] [Good] : Good [Surgeon Name ___] : surgeon: [unfilled] [Electrocardiogram] : ~T an ECG ~C was performed [Echocardiogram] : ~T an echocardiogram ~C was performed [Metabolic Capacity ___Mets%] : The patient has a metabolic capacity of [unfilled] Mets%  [Poor] : Poor [Fever] : no fever [Chills] : no chills [Fatigue] : no fatigue [Chest Pain] : no chest pain [Cough] : no cough [Dyspnea] : no dyspnea [Dysuria] : no dysuria [Urinary Frequency] : no urinary frequency [Nausea] : no nausea [Vomiting] : no vomiting [Diarrhea] : no diarrhea [Abdominal Pain] : no abdominal pain [Easy Bruising] : no easy bruising [Lower Extremity Swelling] : no lower extremity swelling [Poor Exercise Tolerance] : no poor exercise tolerance [Diabetes] : no diabetes [Cardiovascular Disease] : no cardiovascular disease [Anti-Platelet Agents] : no anti-platelet agents [Pulmonary Disease] : no pulmonary disease [Prior Anesthesia] : No prior anesthesia [Nicotine Dependence] : no nicotine dependence [Prev Anesthesia Reaction] : no previous anesthesia reaction

## 2020-02-17 NOTE — DISCUSSION/SUMMARY
[Procedure Intermediate Risk] : the procedure risk is intermediate [Patient Intermediate Risk] : the patient is an intermediate risk [As per surgery] : as per surgery [Optimized for Surgery] : the patient is optimized for surgery [Continue] : Continue medications as currently directed [FreeTextEntry1] : Follow up after surgery.

## 2020-02-17 NOTE — PHYSICAL EXAM
[General Appearance - Well Developed] : well developed [Well Groomed] : well groomed [General Appearance - In No Acute Distress] : no acute distress [No Deformities] : no deformities [Normal Oral Mucosa] : normal oral mucosa [Eyelids - No Xanthelasma] : the eyelids demonstrated no xanthelasmas [Normal Conjunctiva] : the conjunctiva exhibited no abnormalities [Normal Jugular Venous A Waves Present] : normal jugular venous A waves present [No Oral Cyanosis] : no oral cyanosis [No Oral Pallor] : no oral pallor [No Jugular Venous Whitehead A Waves] : no jugular venous whitehead A waves [Normal Jugular Venous V Waves Present] : normal jugular venous V waves present [Exaggerated Use Of Accessory Muscles For Inspiration] : no accessory muscle use [Respiration, Rhythm And Depth] : normal respiratory rhythm and effort [Auscultation Breath Sounds / Voice Sounds] : lungs were clear to auscultation bilaterally [Heart Sounds] : normal S1 and S2 [Abdomen Soft] : soft [Murmurs] : no murmurs present [Heart Rate And Rhythm] : heart rate and rhythm were normal [Abdomen Tenderness] : non-tender [Abdomen Mass (___ Cm)] : no abdominal mass palpated [Gait - Sufficient For Exercise Testing] : the gait was sufficient for exercise testing [Abnormal Walk] : normal gait [Nail Clubbing] : no clubbing of the fingernails [Cyanosis, Localized] : no localized cyanosis [Petechial Hemorrhages (___cm)] : no petechial hemorrhages [] : no rash [Skin Color & Pigmentation] : normal skin color and pigmentation [No Venous Stasis] : no venous stasis [Skin Lesions] : no skin lesions [No Xanthoma] : no  xanthoma was observed [No Skin Ulcers] : no skin ulcer [Affect] : the affect was normal [Mood] : the mood was normal [Oriented To Time, Place, And Person] : oriented to person, place, and time [No Anxiety] : not feeling anxious [FreeTextEntry1] : morbidly obese

## 2020-02-20 ENCOUNTER — APPOINTMENT (OUTPATIENT)
Dept: BARIATRICS | Facility: CLINIC | Age: 40
End: 2020-02-20
Payer: MEDICAID

## 2020-02-20 VITALS
HEART RATE: 84 BPM | SYSTOLIC BLOOD PRESSURE: 140 MMHG | WEIGHT: 315 LBS | HEIGHT: 69 IN | DIASTOLIC BLOOD PRESSURE: 84 MMHG | BODY MASS INDEX: 46.65 KG/M2 | OXYGEN SATURATION: 95 %

## 2020-02-20 PROCEDURE — 99214 OFFICE O/P EST MOD 30 MIN: CPT

## 2020-02-25 NOTE — PHYSICAL EXAM
[Obese, well nourished, in no acute distress] : obese, well nourished, in no acute distress [Normal] : affect appropriate [de-identified] : normoactive bowel sounds, soft and non tender, no hepatosplenomegaly or masses appreciated.

## 2020-02-25 NOTE — ASSESSMENT
[FreeTextEntry1] : 40 year old M undergoing workup for laparoscopic sleeve gastrectomy here for PRE OP  VISIT. Weight gain since last visit.  Patient is currently in the process of completing his workup as well as a medically supervised diet.Pt is compliant with CPAP machine. Walking 7-8 K steps per day. \par \par Pre op education classes completed. \par Nutrition one on one completed  Psych -9/10- completed.\par Patient is aware he needs to lose weight prior to surgery. \par Upper EGD completed \par Needs letter of support/ diet history / routine labs completed.\par Appointments  and testing with cardiology /pulmonary completed. \par \par \par Nutritional counseling has been provided. The patient is encouraged to remain calorie conscious and continue a low fat, low carbohydrate, protein focus diet. Pt encouraged to participate in a daily exercise regimen incorporating cardio and strength training. \par \par Dr. Greer saw patient today. Discussed eating 3 meals per day / consuming lean protein/ zero calorie liquid/ may be on modified liquid diet for 3 weeks prior to surgery. \par \par Return to office in on 3/12/2020- weight check.  Will consider seeing [facility] prior to scheduling surgery. \par

## 2020-02-25 NOTE — REVIEW OF SYSTEMS
[Recent Change In Weight] : ~T recent weight change [Negative] : Neurological [Fever] : no fever [Chills] : no chills [Dysphagia] : no dysphagia [Chest Pain] : no chest pain [Palpitations] : no palpitations [Wheezing] : no wheezing [Shortness Of Breath] : no shortness of breath [Cough] : no cough [Abdominal Pain] : no abdominal pain [SOB on Exertion] : no shortness of breath during exertion [Constipation] : no constipation [Vomiting] : no vomiting [Diarrhea] : no diarrhea [Hernia] : no hernia [Reflux/Heartburn] : no reflex/heartburn [FreeTextEntry2] : weight gain

## 2020-02-25 NOTE — HISTORY OF PRESENT ILLNESS
[de-identified] : 40 year old M undergoing workup for laparoscopic sleeve gastrectomy here for PRE OP  VISIT. Weight gain since last visit.  Patient has completed  his workup as well as a medically supervised diet. Patient continues to make efforts to improve food choices and increased activity.Pt is following a protein focused diet - 2 meals a day - consuming a sufficient amount of zero calorie liquid ( history of backloading calories). Pt consumes 1 cup of coffee with splash of creamer -breakfast   / lunch consumes a sandwich or 2 slices of pizza/  snack on deloris -o cheese stick or apples/ Dinner - fish or meat with vegetables. Patient knows he needs to lose weight prior to surgery.Exercising regularly as recommended.All questions were answered.\par

## 2020-03-11 DIAGNOSIS — Z13.21 ENCOUNTER FOR SCREENING FOR NUTRITIONAL DISORDER: ICD-10-CM

## 2020-03-11 DIAGNOSIS — Z13.0 ENCOUNTER FOR SCREENING FOR DISEASES OF THE BLOOD AND BLOOD-FORMING ORGANS AND CERTAIN DISORDERS INVOLVING THE IMMUNE MECHANISM: ICD-10-CM

## 2020-03-11 DIAGNOSIS — Z13.29 ENCOUNTER FOR SCREENING FOR OTHER SUSPECTED ENDOCRINE DISORDER: ICD-10-CM

## 2020-03-12 ENCOUNTER — APPOINTMENT (OUTPATIENT)
Dept: BARIATRICS | Facility: CLINIC | Age: 40
End: 2020-03-12
Payer: COMMERCIAL

## 2020-03-12 VITALS
HEART RATE: 76 BPM | HEIGHT: 69 IN | WEIGHT: 315 LBS | DIASTOLIC BLOOD PRESSURE: 90 MMHG | SYSTOLIC BLOOD PRESSURE: 140 MMHG | BODY MASS INDEX: 46.65 KG/M2 | OXYGEN SATURATION: 93 %

## 2020-03-12 PROCEDURE — 99213 OFFICE O/P EST LOW 20 MIN: CPT

## 2020-03-16 ENCOUNTER — APPOINTMENT (OUTPATIENT)
Dept: BARIATRICS/WEIGHT MGMT | Facility: CLINIC | Age: 40
End: 2020-03-16

## 2020-03-16 NOTE — HISTORY OF PRESENT ILLNESS
[de-identified] : 40 year old M undergoing workup for laparoscopic sleeve gastrectomy here for PRE OP  VISIT. Lost 13 lbs since last visit.  Patient has completed  his workup as well as a medically supervised diet. Patient continues to make efforts to improve food choices and increased activity.Pt is following a protein focused diet - 2 meals a day - consuming a sufficient amount of zero calorie liquid ( history of backloading calories). Pt occasional  consumes a cup of coffee with splash of creamer -breakfast- decrease since last visit   / pt has stopped consuming sandwiches and  pizza and stopped snacking on cheese since last visit. Pt is consuming 2 protein shakes + 1 lean /green meal per day + 1 protein snack per day. Patient knows he needs to lose weight prior to surgery.Exercising regularly as recommended.All questions were answered.\par

## 2020-03-16 NOTE — REVIEW OF SYSTEMS
[Recent Change In Weight] : ~T recent weight change [Negative] : Endocrine [Fever] : no fever [Chills] : no chills [Dysphagia] : no dysphagia [Chest Pain] : no chest pain [Palpitations] : no palpitations [Shortness Of Breath] : no shortness of breath [Wheezing] : no wheezing [Cough] : no cough [SOB on Exertion] : no shortness of breath during exertion [Abdominal Pain] : no abdominal pain [Vomiting] : no vomiting [Constipation] : no constipation [Diarrhea] : no diarrhea [Reflux/Heartburn] : no reflex/heartburn [Hernia] : no hernia [FreeTextEntry2] : weight loss since last visit.

## 2020-03-16 NOTE — ASSESSMENT
[FreeTextEntry1] : 40 year old M undergoing workup for laparoscopic sleeve gastrectomy here for PRE OP  VISIT. Lost 13 lbs since last visit.  Patient is currently in the process of completing his workup as well as a medically supervised diet.Pt is compliant with CPAP machine. Walking 7-8 K steps per day. No history of post surgical urinary retention or nausea.Plan to discuss with PCP when to stop ASA prior to surgery. \par \par Pre op education classes completed. \par Nutrition one on one completed  Psych completed.\par Patient is aware he needs to lose weight prior to surgery. \par Upper EGD completed.\par Plan to be continue modified liquid diet prior to surgery.  \par Plan to see psych prior to surgery for follow up visit. \par Completed letter of support/ diet history / routine labs completed.\par Appointments  and testing with cardiology /pulmonary completed. \par \par Nutritional counseling has been provided. The patient is encouraged to remain calorie conscious and continue a low fat, low carbohydrate, protein focus diet. Pt encouraged to participate in a daily exercise regimen incorporating cardio and strength training. \par  \par Surgery scheduled on 4/1/2020. Dr. Greer saw patient - answered all questions and concerns. \par \par Return to office at 1 week post op visit.  \par

## 2020-03-16 NOTE — PHYSICAL EXAM
[Obese, well nourished, in no acute distress] : obese, well nourished, in no acute distress [Normal] : affect appropriate [de-identified] : normoactive bowel sounds, soft and non tender, no hepatosplenomegaly or masses appreciated.

## 2020-03-30 ENCOUNTER — RX RENEWAL (OUTPATIENT)
Age: 40
End: 2020-03-30

## 2020-04-01 ENCOUNTER — APPOINTMENT (OUTPATIENT)
Dept: BARIATRICS | Facility: HOSPITAL | Age: 40
End: 2020-04-01

## 2020-04-07 ENCOUNTER — APPOINTMENT (OUTPATIENT)
Dept: BARIATRICS | Facility: CLINIC | Age: 40
End: 2020-04-07

## 2020-05-15 ENCOUNTER — APPOINTMENT (OUTPATIENT)
Dept: BARIATRICS | Facility: CLINIC | Age: 40
End: 2020-05-15

## 2020-05-15 ENCOUNTER — APPOINTMENT (OUTPATIENT)
Dept: BARIATRICS/WEIGHT MGMT | Facility: CLINIC | Age: 40
End: 2020-05-15

## 2020-05-22 ENCOUNTER — APPOINTMENT (OUTPATIENT)
Dept: ORTHOPEDIC SURGERY | Facility: CLINIC | Age: 40
End: 2020-05-22
Payer: MEDICAID

## 2020-05-22 VITALS
DIASTOLIC BLOOD PRESSURE: 101 MMHG | BODY MASS INDEX: 45.1 KG/M2 | WEIGHT: 315 LBS | SYSTOLIC BLOOD PRESSURE: 167 MMHG | HEIGHT: 70 IN | TEMPERATURE: 97.9 F | HEART RATE: 73 BPM

## 2020-05-22 DIAGNOSIS — Z87.39 PERSONAL HISTORY OF OTHER DISEASES OF THE MUSCULOSKELETAL SYSTEM AND CONNECTIVE TISSUE: ICD-10-CM

## 2020-05-22 DIAGNOSIS — Z86.79 PERSONAL HISTORY OF OTHER DISEASES OF THE CIRCULATORY SYSTEM: ICD-10-CM

## 2020-05-22 PROCEDURE — 73502 X-RAY EXAM HIP UNI 2-3 VIEWS: CPT | Mod: LT

## 2020-05-22 PROCEDURE — 99203 OFFICE O/P NEW LOW 30 MIN: CPT

## 2020-05-27 NOTE — DISCUSSION/SUMMARY
[de-identified] : The patient presents with OA of the left hip.  The patient is going to be sent to a spine specialist for the sciatica.  He is given Voltaren gel.  He is going to get a CT scan of the left hip to rule out any other pathology.  We will do a two week Telehealth evaluation.

## 2020-05-27 NOTE — ADDENDUM
[FreeTextEntry1] : This note was dictated by Maureen Gage, OTR/L, PA. \par \par This note was written by Shirley Thakkar on 05/27/2020 acting as scribe for Donato Campbell III, MD

## 2020-05-27 NOTE — HISTORY OF PRESENT ILLNESS
[7] : a current pain level of 7/10 [5] : the ailment interference is 5/10 [8] : the ailment interference is 8/10 [9] : the ailment interference is 9/10 [10  (interferes completely)] : the ailment interference is10/10 (interferes completely) [de-identified] : Jorgito is an obese male that comes in today complaining of some left hip pain as well as pain going down the back of his leg and his lower back.   This injury is not due to an automobile accident.  The patient states the pain is constant.  The patient describes the pain as sharp, achy and shooting.  The patient states Tylenol and rest make the symptoms better while walking and bending make the symptoms worse. [] : No

## 2020-05-27 NOTE — CONSULT LETTER
[Dear  ___] : Dear  [unfilled], [Consult Letter:] : I had the pleasure of evaluating your patient, [unfilled]. [Please see my note below.] : Please see my note below. [Consult Closing:] : Thank you very much for allowing me to participate in the care of this patient.  If you have any questions, please do not hesitate to contact me. [Sincerely,] : Sincerely, [FreeTextEntry3] : Donato Campbell III, MD \par AMY/sagar

## 2020-05-27 NOTE — REVIEW OF SYSTEMS
[Joint Pain] : joint pain [Joint Stiffness] : joint stiffness [Joint Swelling] : joint swelling [Feeling Tired] : fatigue [Negative] : Heme/Lymph [FreeTextEntry9] : As noted in HPI

## 2020-05-27 NOTE — PHYSICAL EXAM
[de-identified] : Right hip:\par Hip: Range of Motion in Degrees:\par 	                                 Claimant:	   Normal:	\par Flexion (Active) 	                 120 	   120-degrees	\par Flexion (Passive)	                 120	   120-degrees	\par Extension (Active)	                 -30	   -30-degrees	\par Extension (Passive)	 -30	   -30-degrees	\par Abduction (Active)	                 45-50	   [id number]	\par Abduction (Passive)	 45-50	   [id number]	\par Adduction (Active)  	 20-30	   [id number]	\par Adduction (Passive)	 20-30	   [id number]	\par Internal Rotation (Active) 	 35	   35-degrees	\par Internal Rotation (Passive)	 35	   35-degrees	\par External Rotation (Active)	 45	   45-degrees	\par External Rotation (Passive)	 45	   45-degrees	\par \par No tenderness with internal or external rotation or axial load.  No tenderness to palpation over the greater trochanter.  Negative Trendelenburg.  No tenderness with resisted abduction.  No weakness to flexion, extension, abduction or adduction.  No evidence of instability.  No motor or sensory deficits.  2+ DP and PT pulses.  Skin is intact.  No scars, rashes or lesions.  \par  \par Left hip:\par Range of motion is limited secondary to him being obese, as well as secondary to significant pain.  He hardly has any internal or external rotation of the left hip  - about 5 degrees of each way.  Flexion is possibly about 60-65.  Full extension.  The patient is unable to do a full straight leg raise on the left lower extremity and on the right he can.  \par \par Tenderness into the groin with internal and external rotation and axial load.  No tenderness to palpation over the greater trochanter.  Negative Trendelenburg.  No tenderness with resisted abduction.  No weakness to flexion, extension, abduction or adduction.  No evidence of instability.  No motor or sensory deficits.  2+ DP and PT pulses.  Skin is intact.  No scars, rashes or lesions.  Possible spurs.\par   [de-identified] : He does walk with a slightly antalgic gait favoring the left lower extremity. [de-identified] : X-ray examination, two to three views of the left hip, including pelvis, reveals endstage OA with possible spurs.

## 2020-05-28 ENCOUNTER — APPOINTMENT (OUTPATIENT)
Dept: BARIATRICS | Facility: CLINIC | Age: 40
End: 2020-05-28
Payer: MEDICAID

## 2020-05-28 VITALS — BODY MASS INDEX: 45.1 KG/M2 | WEIGHT: 315 LBS | HEIGHT: 70 IN

## 2020-05-28 DIAGNOSIS — Z86.39 PERSONAL HISTORY OF OTHER ENDOCRINE, NUTRITIONAL AND METABOLIC DISEASE: ICD-10-CM

## 2020-05-28 DIAGNOSIS — Z87.898 PERSONAL HISTORY OF OTHER SPECIFIED CONDITIONS: ICD-10-CM

## 2020-05-28 PROBLEM — Z13.0 SCREENING FOR OTHER DISORDERS OF BLOOD AND BLOOD-FORMING ORGANS: Status: RESOLVED | Noted: 2019-07-16 | Resolved: 2020-05-28

## 2020-05-28 PROBLEM — Z13.21 ENCOUNTER FOR VITAMIN DEFICIENCY SCREENING: Status: RESOLVED | Noted: 2018-11-01 | Resolved: 2020-05-28

## 2020-05-28 PROBLEM — Z13.29 SCREENING FOR ENDOCRINE DISORDER: Status: RESOLVED | Noted: 2018-11-01 | Resolved: 2020-05-28

## 2020-05-28 PROCEDURE — 99214 OFFICE O/P EST MOD 30 MIN: CPT | Mod: 95

## 2020-05-29 ENCOUNTER — APPOINTMENT (OUTPATIENT)
Dept: BARIATRICS/WEIGHT MGMT | Facility: CLINIC | Age: 40
End: 2020-05-29

## 2020-06-02 ENCOUNTER — OUTPATIENT (OUTPATIENT)
Dept: OUTPATIENT SERVICES | Facility: HOSPITAL | Age: 40
LOS: 1 days | End: 2020-06-02
Payer: MEDICAID

## 2020-06-02 ENCOUNTER — APPOINTMENT (OUTPATIENT)
Dept: CT IMAGING | Facility: CLINIC | Age: 40
End: 2020-06-02
Payer: MEDICAID

## 2020-06-02 DIAGNOSIS — Z90.49 ACQUIRED ABSENCE OF OTHER SPECIFIED PARTS OF DIGESTIVE TRACT: Chronic | ICD-10-CM

## 2020-06-02 DIAGNOSIS — Z00.8 ENCOUNTER FOR OTHER GENERAL EXAMINATION: ICD-10-CM

## 2020-06-02 DIAGNOSIS — M16.12 UNILATERAL PRIMARY OSTEOARTHRITIS, LEFT HIP: ICD-10-CM

## 2020-06-02 PROCEDURE — 76376 3D RENDER W/INTRP POSTPROCES: CPT

## 2020-06-02 PROCEDURE — 76376 3D RENDER W/INTRP POSTPROCES: CPT | Mod: 26

## 2020-06-02 PROCEDURE — 73700 CT LOWER EXTREMITY W/O DYE: CPT | Mod: 26,LT

## 2020-06-02 PROCEDURE — 73700 CT LOWER EXTREMITY W/O DYE: CPT

## 2020-06-03 ENCOUNTER — APPOINTMENT (OUTPATIENT)
Dept: BARIATRICS/WEIGHT MGMT | Facility: CLINIC | Age: 40
End: 2020-06-03
Payer: COMMERCIAL

## 2020-06-03 VITALS — HEIGHT: 70 IN | WEIGHT: 315 LBS | BODY MASS INDEX: 45.1 KG/M2

## 2020-06-03 PROCEDURE — 90791 PSYCH DIAGNOSTIC EVALUATION: CPT | Mod: 95

## 2020-06-04 NOTE — ASSESSMENT
[FreeTextEntry1] : 40 year old male with longstanding history of morbid obesity previously scheduled for laparoscopic sleeve gastrectomy and subsequently cancelled because of COVID 19 pandemic presents today for preoperative visit. Patient was encouraged to lose weight prior to surgery. Patient will be on preoperative modified liquid diet.  Nutrition and exercise guidelines were reviewed with the patient. Patient will attend preoperative education class. Procedure risks and benefits were again discussed with patient. The additional risks associated with current COVID 19 pandemic was discussed in detail.  Patient was informed that he will get tested for COVID 19 prior to surgery and then need to self quarantine for a period of time before and after surgery.  All questions were answered.\par \par Schedule surgery date for June 29\par Three week preoperative modified liquid diet\par PST and Medical clearance - dates to be determined\par Preoperative education class\par Call if any questions or concerns.

## 2020-06-04 NOTE — HISTORY OF PRESENT ILLNESS
[Medical Office: ([facility])___] : at the medical office located in  [Verbal consent obtained from patient] : the patient, [unfilled] [Home] : at home, [unfilled] , at the time of the visit. [de-identified] : 40 year old male with longstanding history of morbid obesity previously scheduled for laparoscopic sleeve gastrectomy and subsequently cancelled because of COVID 19 pandemic presents today for preoperative visit. Patient lost weight since last visit. He is making efforts to have three protein rich meals a day and no liquid calories. For exercise, he is walking frequently. Patient has YISSEL and is using CPAP nightly. He denies history of motion sickness and postoperative nausea and urinary retention. He is eager to have surgery especially now that wife is pregnant and due in September.

## 2020-06-04 NOTE — REVIEW OF SYSTEMS
[Recent Change In Weight] : ~T recent weight change [Fever] : no fever [Chills] : no chills [Dysphagia] : no dysphagia [Chest Pain] : no chest pain [Palpitations] : no palpitations [Shortness Of Breath] : no shortness of breath [Wheezing] : no wheezing [SOB on Exertion] : no shortness of breath during exertion [Cough] : no cough [Abdominal Pain] : no abdominal pain [Vomiting] : no vomiting [Constipation] : no constipation [Diarrhea] : no diarrhea [Reflux/Heartburn] : no reflex/heartburn [Hernia] : no hernia [Negative] : Allergic/Immunologic [FreeTextEntry2] : weight loss

## 2020-06-04 NOTE — PHYSICAL EXAM
[Obese, well nourished, in no acute distress] : obese, well nourished, in no acute distress [Normal] : well developed, well nourished, in no acute distress

## 2020-06-11 ENCOUNTER — APPOINTMENT (OUTPATIENT)
Dept: BARIATRICS/WEIGHT MGMT | Facility: CLINIC | Age: 40
End: 2020-06-11
Payer: SELF-PAY

## 2020-06-11 ENCOUNTER — APPOINTMENT (OUTPATIENT)
Dept: ORTHOPEDIC SURGERY | Facility: CLINIC | Age: 40
End: 2020-06-11
Payer: MEDICAID

## 2020-06-11 VITALS — WEIGHT: 315 LBS | BODY MASS INDEX: 45.1 KG/M2 | HEIGHT: 70 IN

## 2020-06-11 PROCEDURE — 99441: CPT

## 2020-06-11 PROCEDURE — 98968 PH1 ASSMT&MGMT NQHP 21-30: CPT

## 2020-06-23 ENCOUNTER — NON-APPOINTMENT (OUTPATIENT)
Age: 40
End: 2020-06-23

## 2020-06-23 ENCOUNTER — APPOINTMENT (OUTPATIENT)
Dept: INTERNAL MEDICINE | Facility: CLINIC | Age: 40
End: 2020-06-23
Payer: MEDICAID

## 2020-06-23 VITALS
DIASTOLIC BLOOD PRESSURE: 82 MMHG | SYSTOLIC BLOOD PRESSURE: 120 MMHG | RESPIRATION RATE: 18 BRPM | HEART RATE: 77 BPM | WEIGHT: 315 LBS | TEMPERATURE: 98.2 F | OXYGEN SATURATION: 97 % | BODY MASS INDEX: 46.65 KG/M2 | HEIGHT: 69 IN

## 2020-06-23 DIAGNOSIS — G47.33 OBSTRUCTIVE SLEEP APNEA (ADULT) (PEDIATRIC): ICD-10-CM

## 2020-06-23 PROCEDURE — 93000 ELECTROCARDIOGRAM COMPLETE: CPT

## 2020-06-23 PROCEDURE — 99214 OFFICE O/P EST MOD 30 MIN: CPT

## 2020-06-23 RX ORDER — DICLOFENAC SODIUM 10 MG/G
1 GEL TOPICAL
Qty: 1 | Refills: 0 | Status: COMPLETED | COMMUNITY
Start: 2020-05-22 | End: 2020-06-23

## 2020-06-23 NOTE — PHYSICAL EXAM
[Normal Oropharynx] : normal oropharynx [No Acute Distress] : no acute distress [Normal Appearance] : normal appearance [Normal S1, S2] : normal s1, s2 [Normal Rate/Rhythm] : normal rate/rhythm [No Neck Mass] : no neck mass [No Murmurs] : no murmurs [No Resp Distress] : no resp distress [Clear to Auscultation Bilaterally] : clear to auscultation bilaterally [No Abnormalities] : no abnormalities [Benign] : benign [No Cyanosis] : no cyanosis [No Clubbing] : no clubbing [No Edema] : no edema [No Focal Deficits] : no focal deficits [Normal Color/ Pigmentation] : normal color/ pigmentation [Normal Affect] : normal affect [Oriented x3] : oriented x3

## 2020-06-24 ENCOUNTER — RX RENEWAL (OUTPATIENT)
Age: 40
End: 2020-06-24

## 2020-06-24 LAB
ALBUMIN SERPL ELPH-MCNC: 4.3 G/DL
ALP BLD-CCNC: 87 U/L
ALT SERPL-CCNC: 54 U/L
ANION GAP SERPL CALC-SCNC: 16 MMOL/L
APTT BLD: 36.4 SEC
AST SERPL-CCNC: 30 U/L
BASOPHILS # BLD AUTO: 0.03 K/UL
BASOPHILS NFR BLD AUTO: 0.4 %
BILIRUB SERPL-MCNC: 0.8 MG/DL
BUN SERPL-MCNC: 18 MG/DL
CALCIUM SERPL-MCNC: 9.7 MG/DL
CHLORIDE SERPL-SCNC: 103 MMOL/L
CO2 SERPL-SCNC: 25 MMOL/L
CREAT SERPL-MCNC: 0.72 MG/DL
EOSINOPHIL # BLD AUTO: 0.08 K/UL
EOSINOPHIL NFR BLD AUTO: 1 %
GLUCOSE SERPL-MCNC: 85 MG/DL
HCT VFR BLD CALC: 44.9 %
HGB BLD-MCNC: 13.5 G/DL
IMM GRANULOCYTES NFR BLD AUTO: 0.4 %
INR PPP: 1.04 RATIO
LYMPHOCYTES # BLD AUTO: 2.37 K/UL
LYMPHOCYTES NFR BLD AUTO: 28.9 %
MAN DIFF?: NORMAL
MCHC RBC-ENTMCNC: 29 PG
MCHC RBC-ENTMCNC: 30.1 GM/DL
MCV RBC AUTO: 96.6 FL
MONOCYTES # BLD AUTO: 0.42 K/UL
MONOCYTES NFR BLD AUTO: 5.1 %
NEUTROPHILS # BLD AUTO: 5.26 K/UL
NEUTROPHILS NFR BLD AUTO: 64.2 %
PLATELET # BLD AUTO: 255 K/UL
POTASSIUM SERPL-SCNC: 3.8 MMOL/L
PROT SERPL-MCNC: 7.7 G/DL
PT BLD: 12 SEC
RBC # BLD: 4.65 M/UL
RBC # FLD: 12.8 %
SODIUM SERPL-SCNC: 144 MMOL/L
WBC # FLD AUTO: 8.19 K/UL

## 2020-06-24 NOTE — HISTORY OF PRESENT ILLNESS
[TextBox_4] : This is a 40-year-old male who returns for pulmonary and medical preoperative evaluation before planned bariatric surgery on June 30.  This date had to be rescheduled due to the pandemic.  He has a history of morbid obesity, hypertension, severe obstructive sleep apnea, severe arthritis of the left hip.  Is using his CPAP machine at home set at 18 cm of water.  Denies having excessive daytime somnolence or having to take naps.  In general he awakens feeling refreshed.\par \par He is not having recent coughing, wheezing, or dyspnea on exertion.  He smoked previously for 8 years, half a pack per day, and quit 5 years ago.  No history of asthma or COPD.\par \par He denies recent chest pain, palpitations, lightheadedness, or syncope.

## 2020-06-24 NOTE — DISCUSSION/SUMMARY
[FreeTextEntry1] : 1.  The patient is at some increased but acceptable risk for his planned bariatric surgery from the general medical and pulmonary standpoint, pending my review of his preop labs and EKG.  I recommend close monitoring of the patient intraoperatively and postoperatively.  I also recommend having oxygen and either CPAP or BIPAP available postoperatively, given his history of severe obstructive sleep apnea.  Also this can be used during sleep if the patient is staying overnight in the hospital.  For his hypertension, he will take his enalapril and hydrochlorothiazide with just a sip of water the morning of surgery.\par \par #2 morbid obesity\par \par #3 HTN.\par \par #4 severe arthritis L hip.\par \par 6/24/20:  Labs and EKG reviewed. No medical or pulmonary contraindication to planned bariatric surgery. See above. . \par

## 2020-06-24 NOTE — REVIEW OF SYSTEMS
[Negative] : Psychiatric [Cough] : no cough [Sputum] : no sputum [Dyspnea] : no dyspnea [Chest Discomfort] : no chest discomfort [Wheezing] : no wheezing [Dizziness] : no dizziness [Syncope] : no syncope [Palpitations] : no palpitations [TextBox_91] : see above [TextBox_148] : see above

## 2020-06-25 VITALS
SYSTOLIC BLOOD PRESSURE: 156 MMHG | RESPIRATION RATE: 10 BRPM | HEART RATE: 69 BPM | HEIGHT: 69.5 IN | OXYGEN SATURATION: 99 % | DIASTOLIC BLOOD PRESSURE: 89 MMHG | WEIGHT: 315 LBS | TEMPERATURE: 98 F

## 2020-06-25 RX ORDER — HYOSCYAMINE SULFATE 0.13 MG
0.12 TABLET ORAL EVERY 6 HOURS
Refills: 0 | Status: DISCONTINUED | OUTPATIENT
Start: 2020-06-30 | End: 2020-07-01

## 2020-06-25 RX ORDER — ONDANSETRON 8 MG/1
4 TABLET, FILM COATED ORAL EVERY 6 HOURS
Refills: 0 | Status: DISCONTINUED | OUTPATIENT
Start: 2020-06-30 | End: 2020-07-01

## 2020-06-25 RX ORDER — PANTOPRAZOLE SODIUM 20 MG/1
40 TABLET, DELAYED RELEASE ORAL DAILY
Refills: 0 | Status: DISCONTINUED | OUTPATIENT
Start: 2020-06-30 | End: 2020-07-01

## 2020-06-25 RX ORDER — SODIUM CHLORIDE 9 MG/ML
2000 INJECTION, SOLUTION INTRAVENOUS
Refills: 0 | Status: DISCONTINUED | OUTPATIENT
Start: 2020-06-30 | End: 2020-07-01

## 2020-06-25 RX ORDER — SODIUM CHLORIDE 9 MG/ML
1000 INJECTION, SOLUTION INTRAVENOUS
Refills: 0 | Status: DISCONTINUED | OUTPATIENT
Start: 2020-06-30 | End: 2020-07-01

## 2020-06-25 RX ORDER — ENOXAPARIN SODIUM 100 MG/ML
40 INJECTION SUBCUTANEOUS EVERY 12 HOURS
Refills: 0 | Status: DISCONTINUED | OUTPATIENT
Start: 2020-06-30 | End: 2020-07-01

## 2020-06-25 NOTE — H&P PST ADULT - SOURCE OF INFORMATION, PROFILE
patient Medical history obtained via telephone as per COVID protocol , will perform physical on DOS/patient

## 2020-06-25 NOTE — H&P PST ADULT - HISTORY OF PRESENT ILLNESS
This is a 41 y/o male with hx morbid obesity .He has struggled to loose weight most of his adult life . Has  tried all diets, nutritional counseling and exercise without any success to maintain weight loss. scheduled for laparoscopic sleeve gastrectomy with upper endoscopy on 6/30/20

## 2020-06-25 NOTE — H&P PST ADULT - NSICDXPROBLEM_GEN_ALL_CORE_FT
PROBLEM DIAGNOSES  Problem: Morbid obesity  Assessment and Plan: laparoscopic sleeve gastrectomy   Medical clearance   Pre op instrcctions   COVID testing appt on 6/28/20 appt confirmed

## 2020-06-28 ENCOUNTER — OUTPATIENT (OUTPATIENT)
Dept: OUTPATIENT SERVICES | Facility: HOSPITAL | Age: 40
LOS: 1 days | End: 2020-06-28
Payer: MEDICAID

## 2020-06-28 DIAGNOSIS — Z90.49 ACQUIRED ABSENCE OF OTHER SPECIFIED PARTS OF DIGESTIVE TRACT: Chronic | ICD-10-CM

## 2020-06-28 DIAGNOSIS — G47.33 OBSTRUCTIVE SLEEP APNEA (ADULT) (PEDIATRIC): ICD-10-CM

## 2020-06-28 DIAGNOSIS — Z11.59 ENCOUNTER FOR SCREENING FOR OTHER VIRAL DISEASES: ICD-10-CM

## 2020-06-28 DIAGNOSIS — I10 ESSENTIAL (PRIMARY) HYPERTENSION: ICD-10-CM

## 2020-06-28 DIAGNOSIS — Z01.818 ENCOUNTER FOR OTHER PREPROCEDURAL EXAMINATION: ICD-10-CM

## 2020-06-28 DIAGNOSIS — E66.01 MORBID (SEVERE) OBESITY DUE TO EXCESS CALORIES: ICD-10-CM

## 2020-06-28 LAB — SARS-COV-2 RNA SPEC QL NAA+PROBE: SIGNIFICANT CHANGE UP

## 2020-06-28 PROCEDURE — G0463: CPT

## 2020-06-28 PROCEDURE — U0003: CPT

## 2020-06-29 ENCOUNTER — TRANSCRIPTION ENCOUNTER (OUTPATIENT)
Age: 40
End: 2020-06-29

## 2020-06-30 ENCOUNTER — RESULT REVIEW (OUTPATIENT)
Age: 40
End: 2020-06-30

## 2020-06-30 ENCOUNTER — INPATIENT (INPATIENT)
Facility: HOSPITAL | Age: 40
LOS: 0 days | Discharge: HOME | DRG: 621 | End: 2020-07-01
Attending: SURGERY | Admitting: SURGERY
Payer: MEDICAID

## 2020-06-30 ENCOUNTER — APPOINTMENT (OUTPATIENT)
Dept: BARIATRICS | Facility: HOSPITAL | Age: 40
End: 2020-06-30
Payer: MEDICAID

## 2020-06-30 ENCOUNTER — TRANSCRIPTION ENCOUNTER (OUTPATIENT)
Age: 40
End: 2020-06-30

## 2020-06-30 VITALS
HEART RATE: 69 BPM | HEIGHT: 69 IN | RESPIRATION RATE: 10 BRPM | SYSTOLIC BLOOD PRESSURE: 156 MMHG | WEIGHT: 315 LBS | TEMPERATURE: 98 F | OXYGEN SATURATION: 99 % | DIASTOLIC BLOOD PRESSURE: 89 MMHG

## 2020-06-30 DIAGNOSIS — E66.01 MORBID (SEVERE) OBESITY DUE TO EXCESS CALORIES: ICD-10-CM

## 2020-06-30 DIAGNOSIS — I10 ESSENTIAL (PRIMARY) HYPERTENSION: ICD-10-CM

## 2020-06-30 DIAGNOSIS — Z98.84 BARIATRIC SURGERY STATUS: ICD-10-CM

## 2020-06-30 DIAGNOSIS — K76.0 FATTY (CHANGE OF) LIVER, NOT ELSEWHERE CLASSIFIED: ICD-10-CM

## 2020-06-30 DIAGNOSIS — Z90.49 ACQUIRED ABSENCE OF OTHER SPECIFIED PARTS OF DIGESTIVE TRACT: Chronic | ICD-10-CM

## 2020-06-30 DIAGNOSIS — G47.33 OBSTRUCTIVE SLEEP APNEA (ADULT) (PEDIATRIC): ICD-10-CM

## 2020-06-30 LAB
ABO RH CONFIRMATION: SIGNIFICANT CHANGE UP
BLD GP AB SCN SERPL QL: SIGNIFICANT CHANGE UP

## 2020-06-30 PROCEDURE — 99223 1ST HOSP IP/OBS HIGH 75: CPT

## 2020-06-30 PROCEDURE — 88307 TISSUE EXAM BY PATHOLOGIST: CPT | Mod: 26

## 2020-06-30 PROCEDURE — 43775 LAP SLEEVE GASTRECTOMY: CPT | Mod: 22

## 2020-06-30 PROCEDURE — 43775 LAP SLEEVE GASTRECTOMY: CPT | Mod: AS,22

## 2020-06-30 RX ORDER — ACETAMINOPHEN 500 MG
1000 TABLET ORAL ONCE
Refills: 0 | Status: COMPLETED | OUTPATIENT
Start: 2020-06-30 | End: 2020-06-30

## 2020-06-30 RX ORDER — IBUPROFEN 200 MG
800 TABLET ORAL EVERY 6 HOURS
Refills: 0 | Status: DISCONTINUED | OUTPATIENT
Start: 2020-06-30 | End: 2020-07-01

## 2020-06-30 RX ORDER — ENOXAPARIN SODIUM 100 MG/ML
40 INJECTION SUBCUTANEOUS ONCE
Refills: 0 | Status: COMPLETED | OUTPATIENT
Start: 2020-06-30 | End: 2020-06-30

## 2020-06-30 RX ORDER — HYDROMORPHONE HYDROCHLORIDE 2 MG/ML
0.5 INJECTION INTRAMUSCULAR; INTRAVENOUS; SUBCUTANEOUS
Refills: 0 | Status: DISCONTINUED | OUTPATIENT
Start: 2020-06-30 | End: 2020-06-30

## 2020-06-30 RX ORDER — ONDANSETRON 8 MG/1
4 TABLET, FILM COATED ORAL ONCE
Refills: 0 | Status: COMPLETED | OUTPATIENT
Start: 2020-06-30 | End: 2020-06-30

## 2020-06-30 RX ORDER — HYDROMORPHONE HYDROCHLORIDE 2 MG/ML
0.5 INJECTION INTRAMUSCULAR; INTRAVENOUS; SUBCUTANEOUS EVERY 4 HOURS
Refills: 0 | Status: DISCONTINUED | OUTPATIENT
Start: 2020-06-30 | End: 2020-07-01

## 2020-06-30 RX ORDER — SODIUM CHLORIDE 9 MG/ML
1000 INJECTION, SOLUTION INTRAVENOUS
Refills: 0 | Status: DISCONTINUED | OUTPATIENT
Start: 2020-06-30 | End: 2020-06-30

## 2020-06-30 RX ORDER — ACETAMINOPHEN 500 MG
1000 TABLET ORAL EVERY 6 HOURS
Refills: 0 | Status: COMPLETED | OUTPATIENT
Start: 2020-06-30 | End: 2020-07-01

## 2020-06-30 RX ORDER — CHLORHEXIDINE GLUCONATE 213 G/1000ML
1 SOLUTION TOPICAL ONCE
Refills: 0 | Status: COMPLETED | OUTPATIENT
Start: 2020-06-30 | End: 2020-06-30

## 2020-06-30 RX ORDER — HYDROMORPHONE HYDROCHLORIDE 2 MG/ML
0.5 INJECTION INTRAMUSCULAR; INTRAVENOUS; SUBCUTANEOUS ONCE
Refills: 0 | Status: DISCONTINUED | OUTPATIENT
Start: 2020-06-30 | End: 2020-06-30

## 2020-06-30 RX ORDER — APREPITANT 80 MG/1
40 CAPSULE ORAL ONCE
Refills: 0 | Status: COMPLETED | OUTPATIENT
Start: 2020-06-30 | End: 2020-06-30

## 2020-06-30 RX ORDER — ACETAMINOPHEN 500 MG
1000 TABLET ORAL EVERY 6 HOURS
Refills: 0 | Status: DISCONTINUED | OUTPATIENT
Start: 2020-07-01 | End: 2020-07-01

## 2020-06-30 RX ADMIN — HYDROMORPHONE HYDROCHLORIDE 0.5 MILLIGRAM(S): 2 INJECTION INTRAMUSCULAR; INTRAVENOUS; SUBCUTANEOUS at 23:35

## 2020-06-30 RX ADMIN — HYDROMORPHONE HYDROCHLORIDE 0.5 MILLIGRAM(S): 2 INJECTION INTRAMUSCULAR; INTRAVENOUS; SUBCUTANEOUS at 20:12

## 2020-06-30 RX ADMIN — SODIUM CHLORIDE 150 MILLILITER(S): 9 INJECTION, SOLUTION INTRAVENOUS at 20:12

## 2020-06-30 RX ADMIN — Medication 516 MILLIGRAM(S): at 14:49

## 2020-06-30 RX ADMIN — Medication 400 MILLIGRAM(S): at 18:04

## 2020-06-30 RX ADMIN — Medication 0.12 MILLIGRAM(S): at 17:45

## 2020-06-30 RX ADMIN — Medication 516 MILLIGRAM(S): at 21:19

## 2020-06-30 RX ADMIN — PANTOPRAZOLE SODIUM 40 MILLIGRAM(S): 20 TABLET, DELAYED RELEASE ORAL at 18:04

## 2020-06-30 RX ADMIN — ENOXAPARIN SODIUM 40 MILLIGRAM(S): 100 INJECTION SUBCUTANEOUS at 21:19

## 2020-06-30 RX ADMIN — ONDANSETRON 4 MILLIGRAM(S): 8 TABLET, FILM COATED ORAL at 16:27

## 2020-06-30 RX ADMIN — SODIUM CHLORIDE 150 MILLILITER(S): 9 INJECTION, SOLUTION INTRAVENOUS at 17:45

## 2020-06-30 RX ADMIN — ENOXAPARIN SODIUM 40 MILLIGRAM(S): 100 INJECTION SUBCUTANEOUS at 09:48

## 2020-06-30 RX ADMIN — HYDROMORPHONE HYDROCHLORIDE 0.5 MILLIGRAM(S): 2 INJECTION INTRAMUSCULAR; INTRAVENOUS; SUBCUTANEOUS at 17:45

## 2020-06-30 RX ADMIN — ONDANSETRON 4 MILLIGRAM(S): 8 TABLET, FILM COATED ORAL at 21:19

## 2020-06-30 RX ADMIN — SODIUM CHLORIDE 100 MILLILITER(S): 9 INJECTION, SOLUTION INTRAVENOUS at 14:07

## 2020-06-30 RX ADMIN — Medication 1.25 MILLIGRAM(S): at 21:19

## 2020-06-30 RX ADMIN — HYDROMORPHONE HYDROCHLORIDE 0.5 MILLIGRAM(S): 2 INJECTION INTRAMUSCULAR; INTRAVENOUS; SUBCUTANEOUS at 14:05

## 2020-06-30 RX ADMIN — APREPITANT 40 MILLIGRAM(S): 80 CAPSULE ORAL at 09:47

## 2020-06-30 RX ADMIN — CHLORHEXIDINE GLUCONATE 1 APPLICATION(S): 213 SOLUTION TOPICAL at 09:47

## 2020-06-30 NOTE — CONSULT NOTE ADULT - SUBJECTIVE AND OBJECTIVE BOX
Patient is a 40y old  Male who presents with a chief complaint of "I am having a gastric sleeve" (30 Jun 2020 09:32)  This is a 41 y/o male with hx morbid obesity .He has struggled to loose weight most of his adult life . Has  tried all diets, nutritional counseling and exercise without any success to maintain weight loss. S/P laparoscopic sleeve gastrectomy with upper endoscopy on 6/30/20    HPI:  Patient is seen and examined.    PAST MEDICAL & SURGICAL HISTORY:  YISSEL on CPAP  Fatty liver  HTN (hypertension)  S/P cholecystectomy: 2014        MEDICATIONS  (STANDING):  lactated ringers. 1000 milliLiter(s) (100 mL/Hr) IV Continuous <Continuous>    MEDICATIONS  (PRN):  HYDROmorphone  Injectable 0.5 milliGRAM(s) IV Push every 10 minutes PRN Moderate Pain (4 - 6)  ondansetron Injectable 4 milliGRAM(s) IV Push once PRN Nausea and/or Vomiting      Allergies    morphine (Other (Mild))  penicillins (Unknown)    Intolerances    SOCIAL HISTORY:  Smoker:  YES / NO        PACK YEARS:                         WHEN QUIT?  ETOH use:  YES / NO               FREQUENCY / QUANTITY:  Ilicit Drug use:  YES / NO  Occupation:  Assisted device use (Cane / Walker):  Live with:      FAMILY HISTORY:      Vital Signs Last 24 Hrs  T(C): 36.9 (30 Jun 2020 13:53), Max: 36.9 (30 Jun 2020 13:53)  T(F): 98.4 (30 Jun 2020 13:53), Max: 98.4 (30 Jun 2020 13:53)  HR: 75 (30 Jun 2020 14:15) (68 - 80)  BP: 141/73 (30 Jun 2020 14:15) (141/73 - 156/89)  BP(mean): --  RR: 15 (30 Jun 2020 14:15) (10 - 20)  SpO2: 95% (30 Jun 2020 14:15) (94% - 99%) Patient is a 40y old  Male who presents with a chief complaint of "I am having a gastric sleeve" (30 Jun 2020 09:32)  This is a 39 y/o male with hx morbid obesity .He has struggled to loose weight most of his adult life . Has  tried all diets, nutritional counseling and exercise without any success to maintain weight loss. S/P laparoscopic sleeve gastrectomy with upper endoscopy on 6/30/20    HPI:  Patient is seen and examined.  c/o abdominal pain, received pain meds.    PAST MEDICAL & SURGICAL HISTORY:  YISSEL on CPAP  Fatty liver  HTN (hypertension)  S/P cholecystectomy: 2014        MEDICATIONS  (STANDING):  lactated ringers. 1000 milliLiter(s) (100 mL/Hr) IV Continuous <Continuous>    MEDICATIONS  (PRN):  HYDROmorphone  Injectable 0.5 milliGRAM(s) IV Push every 10 minutes PRN Moderate Pain (4 - 6)  ondansetron Injectable 4 milliGRAM(s) IV Push once PRN Nausea and/or Vomiting      Allergies    morphine (Other (Mild))  penicillins (Unknown)    Intolerances    SOCIAL HISTORY:  Smoker:   NO        PACK YEARS:                         WHEN QUIT?  ETOH use:  NO               FREQUENCY / QUANTITY:  Ilicit Drug use:   NO      FAMILY HISTORY:      Vital Signs Last 24 Hrs  T(C): 36.9 (30 Jun 2020 13:53), Max: 36.9 (30 Jun 2020 13:53)  T(F): 98.4 (30 Jun 2020 13:53), Max: 98.4 (30 Jun 2020 13:53)  HR: 75 (30 Jun 2020 14:15) (68 - 80)  BP: 141/73 (30 Jun 2020 14:15) (141/73 - 156/89)  BP(mean): --  RR: 15 (30 Jun 2020 14:15) (10 - 20)  SpO2: 95% (30 Jun 2020 14:15) (94% - 99%)

## 2020-06-30 NOTE — DISCHARGE NOTE PROVIDER - NSDCCPTREATMENT_GEN_ALL_CORE_FT
PRINCIPAL PROCEDURE  Procedure: Laparoscopic sleeve gastrectomy with laparoscopic repair of hiatal hernia  Findings and Treatment: Tolerated procedure well.      SECONDARY PROCEDURE  Procedure: EGD  Findings and Treatment: Tolerated procedure well.

## 2020-06-30 NOTE — DISCHARGE NOTE PROVIDER - HOSPITAL COURSE
41 yo M  with PMHx of morbid obesity admitted to [facility] for scheduled laparoscopic sleeve gastrectomy and intra-operative EGD and hiatal hernia repair  Post operatively patient did well, good urine output and ambulating well on floor. Pt advanced to bariatric clears which she tolerated . Nutritional guidelines were reviewed with the nutritionist. Patient felt ready for discharge to home. Pt instructed to drink small frequent amounts, start protein drinks and follow dietary guidelines. Instructed to ambulate and use incentive spirometry frequently. Pt to follow up with Dr. Greer in 1 week and call with any questions or concerns. 41 yo M  with PMHx of morbid obesity admitted to [facility] for scheduled laparoscopic sleeve gastrectomy and intra-operative EGD and hiatal hernia repair.  Post operatively patient did well, good urine output and ambulating well on floor. Pt advanced to bariatric clears which she tolerated . Nutritional guidelines were reviewed with the nutritionist. Patient felt ready for discharge to home. Pt instructed to drink small frequent amounts, start protein drinks and follow dietary guidelines. Instructed to ambulate and use incentive spirometry frequently. Pt to follow up with Dr. Greer in 1 week and call with any questions or concerns.

## 2020-06-30 NOTE — DISCHARGE NOTE PROVIDER - NSDCACTIVITY_GEN_ALL_CORE
Do not drive or operate machinery/No heavy lifting/straining/Showering allowed/Walking - Outdoors allowed/Stairs allowed/Do not make important decisions/Walking - Indoors allowed

## 2020-06-30 NOTE — BRIEF OPERATIVE NOTE - NSICDXBRIEFPROCEDURE_GEN_ALL_CORE_FT
PROCEDURES:  EGD 30-Jun-2020 14:03:38  Cassandra Lee  Gastrectomy, sleeve, laparoscopic, with laparoscopic hiatal hernia repair 30-Jun-2020 14:03:30  Cassandra Lee

## 2020-06-30 NOTE — DISCHARGE NOTE PROVIDER - NSDCMRMEDTOKEN_GEN_ALL_CORE_FT
aspirin 81 mg oral tablet, chewable: 1 tab(s) orally once a day  enalapril 20 mg oral tablet: 1 tab(s) orally once a day crush and put in low fat yogurt or pudding.   omeprazole 20 mg oral delayed release capsule: 1 cap(s) orally once a day open and put in low fat yogurt or pudding.   ondansetron 4 mg oral tablet, disintegratin tab(s) orally 3 times a day as needed for nausea   Percocet 5/325 oral tablet: 1 tab(s) orally every 6 hours as needed for moderate to severe pain crush and put in low fat yogurt or pudding.

## 2020-06-30 NOTE — DISCHARGE NOTE PROVIDER - NSDCCPCAREPLAN_GEN_ALL_CORE_FT
PRINCIPAL DISCHARGE DIAGNOSIS  Diagnosis: Morbid obesity  Assessment and Plan of Treatment: Instructed to ambulate and use incentive spirometry frequently. Ice packs to abdominal wall and shoulders as needed for discomfort. Cont bariatric Continue Bariatric Clear Diet . Plan to start Protein shakes at home . Avoid long heat exposure -outdoors. and follow nutritional guidelines as instructed. Pain meds as needed by MD. Pt instructed  to follow up with Dr. Greer in 1 week.      SECONDARY DISCHARGE DIAGNOSES  Diagnosis: S/P laparoscopic sleeve gastrectomy  Assessment and Plan of Treatment: Instructed to ambulate and use incentive spirometry frequently. Ice packs to abdominal wall and shoulders as needed for discomfort. Cont bariatric Continue Bariatric Clear Diet . Plan to start Protein shakes at home . Avoid long heat exposure -outdoors. and follow nutritional guidelines as instructed. Pain meds as needed by MD. Pt instructed  to follow up with Dr. Greer  in 1 week.

## 2020-06-30 NOTE — CONSULT NOTE ADULT - SUBJECTIVE AND OBJECTIVE BOX
PULMONARY/CRITICAL CARE        Patient is a 40y old  Male who presents with a chief complaint of 41 yo M  with PMHx of morbid obesity admitted to [facility] for scheduled laparoscopic sleeve gastrectomy and intra-operative EGD and hiatal hernia repair (30 Jun 2020 15:28)    BRIEF HOSPITAL COURSE: ***    Events last 24 hours: ***    PAST MEDICAL & SURGICAL HISTORY:  YISSEL on CPAP  Fatty liver  HTN (hypertension)  S/P cholecystectomy: 2014    Allergies    morphine (Other (Mild))  penicillins (Unknown)    Intolerances      FAMILY HISTORY/ social: no cigs, etoh.       Review of Systems:  CONSTITUTIONAL: No fever, chills, or fatigue  EYES: No eye pain, visual disturbances, or discharge  ENMT:  No difficulty hearing, tinnitus, vertigo; No sinus or throat pain  NECK: No pain or stiffness  RESPIRATORY: No cough, wheezing, chills or hemoptysis; No shortness of breath  CARDIOVASCULAR: No chest pain, palpitations, dizziness, or leg swelling  GASTROINTESTINAL: mild abdominal  pain. No nausea, vomiting, or hematemesis; No diarrhea or constipation. No melena or hematochezia.  GENITOURINARY: No dysuria, frequency, hematuria, or incontinence  NEUROLOGICAL: No headaches, memory loss, loss of strength, numbness, or tremors  SKIN: No itching, burning, rashes, or lesions   MUSCULOSKELETAL: No joint pain or swelling; No muscle, back, or extremity pain  PSYCHIATRIC: No depression, anxiety, mood swings, or difficulty sleeping      Medications:    enalaprilat Injectable 1.25 milliGRAM(s) IV Push every 6 hours      acetaminophen  IVPB .. 1000 milliGRAM(s) IV Intermittent every 6 hours  HYDROmorphone  Injectable 0.5 milliGRAM(s) IV Push every 4 hours PRN  ibuprofen IVPB .. 800 milliGRAM(s) IV Intermittent every 6 hours PRN  ondansetron Injectable 4 milliGRAM(s) IV Push every 6 hours      enoxaparin Injectable 40 milliGRAM(s) SubCutaneous every 12 hours    hyoscyamine SL 0.125 milliGRAM(s) SubLingual every 6 hours PRN  pantoprazole  Injectable 40 milliGRAM(s) IV Push daily        lactated ringers. 1000 milliLiter(s) IV Continuous <Continuous>  lactated ringers. 2000 milliLiter(s) IV Continuous <Continuous>                ICU Vital Signs Last 24 Hrs  T(C): 36.7 (30 Jun 2020 16:45), Max: 36.9 (30 Jun 2020 13:53)  T(F): 98 (30 Jun 2020 16:45), Max: 98.4 (30 Jun 2020 13:53)  HR: 65 (30 Jun 2020 17:24) (63 - 80)  BP: 139/71 (30 Jun 2020 17:24) (138/74 - 156/89)  BP(mean): --  ABP: --  ABP(mean): --  RR: 16 (30 Jun 2020 17:24) (10 - 20)  SpO2: 98% (30 Jun 2020 17:24) (94% - 99%)    Vital Signs Last 24 Hrs  T(C): 36.7 (30 Jun 2020 16:45), Max: 36.9 (30 Jun 2020 13:53)  T(F): 98 (30 Jun 2020 16:45), Max: 98.4 (30 Jun 2020 13:53)  HR: 65 (30 Jun 2020 17:24) (63 - 80)  BP: 139/71 (30 Jun 2020 17:24) (138/74 - 156/89)  BP(mean): --  RR: 16 (30 Jun 2020 17:24) (10 - 20)  SpO2: 98% (30 Jun 2020 17:24) (94% - 99%)        I&O's Detail    30 Jun 2020 07:01  -  30 Jun 2020 17:29  --------------------------------------------------------  IN:    IV PiggyBack: 250 mL    lactated ringers.: 2450 mL  Total IN: 2700 mL    OUT:    Estimated Blood Loss: 15 mL    Voided: 500 mL  Total OUT: 515 mL    Total NET: 2185 mL            LABS:                CAPILLARY BLOOD GLUCOSE            CULTURES:      Physical Examination:    General: No acute distress.      HEENT: Pupils equal, reactive to light.  Symmetric.    PULM: Clear to auscultation bilaterally, no significant sputum production    CVS: Regular rate and rhythm, no murmurs, rubs, or gallops    ABD: Soft, nondistended, mildly tender, decreased bowel sounds, no masses    EXT: No edema, nontender    SKIN: Warm and well perfused, no rashes noted.    NEURO: Alert, oriented, interactive, nonfocal    RADIOLOGY: ***    CRITICAL CARE TIME SPENT: ***

## 2020-06-30 NOTE — DISCHARGE NOTE PROVIDER - CARE PROVIDER_API CALL
Barbara Greer  SURGERY  [address] [place] [address]  Phone: [phone]  Fax: [phone]  Follow Up Time:

## 2020-06-30 NOTE — CONSULT NOTE ADULT - ASSESSMENT
S/P laparoscopic sleeve gastrectomy with upper endoscopy on 6/30/20  pain meds ibuprofen and dilaudid..  PT/OT.  DVT prophylaxis.  lovenox.      HTN  vasotec IVP with parameter.      YISSEL on CPAP S/P laparoscopic sleeve gastrectomy with upper endoscopy on 6/30/20  pain meds ibuprofen and dilaudid..  PT/OT.  DVT prophylaxis.  lovenox.      HTN  vasotec IVP with parameter.      YISSEL on CPAP  remote tele.        Plan of care was discuss with patient, all questions were answered, seems understand and in agreement. S/P laparoscopic sleeve gastrectomy with upper endoscopy on 6/30/20  pain meds ibuprofen and dilaudid..  PT/OT.  DVT prophylaxis.  lovenox.      HTN  vasotec IVP with parameter.    Morbid Obesity  YISSEL on CPAP  remote tele.        Plan of care was discuss with patient, all questions were answered, seems understand and in agreement.

## 2020-06-30 NOTE — PROGRESS NOTE ADULT - SUBJECTIVE AND OBJECTIVE BOX
phyical exam at bedside for admission  vital signs stable  Allergic to PCN and Morphine  NPO since 6/28/2020

## 2020-06-30 NOTE — DISCHARGE NOTE PROVIDER - NSDCFUSCHEDAPPT_GEN_ALL_CORE_FT
MARY LOU CARRION ; 07/07/2020 ; [facility] [id number]  MARY LOU CARRION ; 08/04/2020 ; NPP [facility] [address] MARY LOU Wright ; 08/04/2020 ; NPP Surg Bariatric [address]

## 2020-06-30 NOTE — DISCHARGE NOTE PROVIDER - REASON FOR ADMISSION
41 yo M  with PMHx of morbid obesity admitted to [facility] for scheduled laparoscopic sleeve gastrectomy and intra-operative EGD and hiatal hernia repair

## 2020-06-30 NOTE — BRIEF OPERATIVE NOTE - NSICDXBRIEFPOSTOP_GEN_ALL_CORE_FT
POST-OP DIAGNOSIS:  Hiatal hernia 30-Jun-2020 14:04:24  Cassandra Lee  Morbid obesity 30-Jun-2020 14:04:17  Cassandra Lee

## 2020-07-01 ENCOUNTER — TRANSCRIPTION ENCOUNTER (OUTPATIENT)
Age: 40
End: 2020-07-01

## 2020-07-01 VITALS
HEART RATE: 59 BPM | RESPIRATION RATE: 18 BRPM | DIASTOLIC BLOOD PRESSURE: 93 MMHG | SYSTOLIC BLOOD PRESSURE: 159 MMHG | OXYGEN SATURATION: 96 % | TEMPERATURE: 98 F

## 2020-07-01 DIAGNOSIS — K44.9 DIAPHRAGMATIC HERNIA WITHOUT OBSTRUCTION OR GANGRENE: ICD-10-CM

## 2020-07-01 LAB
ANION GAP SERPL CALC-SCNC: 9 MMOL/L — SIGNIFICANT CHANGE UP (ref 5–17)
BASOPHILS # BLD AUTO: 0.01 K/UL — SIGNIFICANT CHANGE UP (ref 0–0.2)
BASOPHILS NFR BLD AUTO: 0.1 % — SIGNIFICANT CHANGE UP (ref 0–2)
BUN SERPL-MCNC: 10 MG/DL — SIGNIFICANT CHANGE UP (ref 7–23)
CALCIUM SERPL-MCNC: 9.4 MG/DL — SIGNIFICANT CHANGE UP (ref 8.4–10.5)
CHLORIDE SERPL-SCNC: 105 MMOL/L — SIGNIFICANT CHANGE UP (ref 96–108)
CO2 SERPL-SCNC: 27 MMOL/L — SIGNIFICANT CHANGE UP (ref 22–31)
CREAT SERPL-MCNC: 0.75 MG/DL — SIGNIFICANT CHANGE UP (ref 0.5–1.3)
EOSINOPHIL # BLD AUTO: 0.01 K/UL — SIGNIFICANT CHANGE UP (ref 0–0.5)
EOSINOPHIL NFR BLD AUTO: 0.1 % — SIGNIFICANT CHANGE UP (ref 0–6)
GLUCOSE SERPL-MCNC: 95 MG/DL — SIGNIFICANT CHANGE UP (ref 70–99)
HCT VFR BLD CALC: 40.4 % — SIGNIFICANT CHANGE UP (ref 39–50)
HGB BLD-MCNC: 13.1 G/DL — SIGNIFICANT CHANGE UP (ref 13–17)
IMM GRANULOCYTES NFR BLD AUTO: 0.3 % — SIGNIFICANT CHANGE UP (ref 0–1.5)
LYMPHOCYTES # BLD AUTO: 1.44 K/UL — SIGNIFICANT CHANGE UP (ref 1–3.3)
LYMPHOCYTES # BLD AUTO: 11.6 % — LOW (ref 13–44)
MCHC RBC-ENTMCNC: 29.4 PG — SIGNIFICANT CHANGE UP (ref 27–34)
MCHC RBC-ENTMCNC: 32.4 GM/DL — SIGNIFICANT CHANGE UP (ref 32–36)
MCV RBC AUTO: 90.8 FL — SIGNIFICANT CHANGE UP (ref 80–100)
MONOCYTES # BLD AUTO: 0.53 K/UL — SIGNIFICANT CHANGE UP (ref 0–0.9)
MONOCYTES NFR BLD AUTO: 4.3 % — SIGNIFICANT CHANGE UP (ref 2–14)
NEUTROPHILS # BLD AUTO: 10.34 K/UL — HIGH (ref 1.8–7.4)
NEUTROPHILS NFR BLD AUTO: 83.6 % — HIGH (ref 43–77)
NRBC # BLD: 0 /100 WBCS — SIGNIFICANT CHANGE UP (ref 0–0)
PLATELET # BLD AUTO: 246 K/UL — SIGNIFICANT CHANGE UP (ref 150–400)
POTASSIUM SERPL-MCNC: 3.9 MMOL/L — SIGNIFICANT CHANGE UP (ref 3.5–5.3)
POTASSIUM SERPL-SCNC: 3.9 MMOL/L — SIGNIFICANT CHANGE UP (ref 3.5–5.3)
RBC # BLD: 4.45 M/UL — SIGNIFICANT CHANGE UP (ref 4.2–5.8)
RBC # FLD: 12.3 % — SIGNIFICANT CHANGE UP (ref 10.3–14.5)
SODIUM SERPL-SCNC: 141 MMOL/L — SIGNIFICANT CHANGE UP (ref 135–145)
WBC # BLD: 12.37 K/UL — HIGH (ref 3.8–10.5)
WBC # FLD AUTO: 12.37 K/UL — HIGH (ref 3.8–10.5)

## 2020-07-01 PROCEDURE — 99233 SBSQ HOSP IP/OBS HIGH 50: CPT

## 2020-07-01 PROCEDURE — 85027 COMPLETE CBC AUTOMATED: CPT

## 2020-07-01 PROCEDURE — 88307 TISSUE EXAM BY PATHOLOGIST: CPT

## 2020-07-01 PROCEDURE — 86901 BLOOD TYPING SEROLOGIC RH(D): CPT

## 2020-07-01 PROCEDURE — 36415 COLL VENOUS BLD VENIPUNCTURE: CPT

## 2020-07-01 PROCEDURE — C1889: CPT

## 2020-07-01 PROCEDURE — 94664 DEMO&/EVAL PT USE INHALER: CPT

## 2020-07-01 PROCEDURE — 94660 CPAP INITIATION&MGMT: CPT

## 2020-07-01 PROCEDURE — 80048 BASIC METABOLIC PNL TOTAL CA: CPT

## 2020-07-01 PROCEDURE — 86900 BLOOD TYPING SEROLOGIC ABO: CPT

## 2020-07-01 PROCEDURE — 86850 RBC ANTIBODY SCREEN: CPT

## 2020-07-01 RX ADMIN — Medication 516 MILLIGRAM(S): at 11:54

## 2020-07-01 RX ADMIN — ONDANSETRON 4 MILLIGRAM(S): 8 TABLET, FILM COATED ORAL at 08:55

## 2020-07-01 RX ADMIN — Medication 1.25 MILLIGRAM(S): at 14:04

## 2020-07-01 RX ADMIN — ONDANSETRON 4 MILLIGRAM(S): 8 TABLET, FILM COATED ORAL at 02:53

## 2020-07-01 RX ADMIN — Medication 516 MILLIGRAM(S): at 06:40

## 2020-07-01 RX ADMIN — ENOXAPARIN SODIUM 40 MILLIGRAM(S): 100 INJECTION SUBCUTANEOUS at 08:54

## 2020-07-01 RX ADMIN — SODIUM CHLORIDE 1000 MILLILITER(S): 9 INJECTION, SOLUTION INTRAVENOUS at 08:40

## 2020-07-01 RX ADMIN — Medication 400 MILLIGRAM(S): at 02:53

## 2020-07-01 RX ADMIN — Medication 400 MILLIGRAM(S): at 07:51

## 2020-07-01 RX ADMIN — Medication 1.25 MILLIGRAM(S): at 07:51

## 2020-07-01 RX ADMIN — PANTOPRAZOLE SODIUM 40 MILLIGRAM(S): 20 TABLET, DELAYED RELEASE ORAL at 11:41

## 2020-07-01 RX ADMIN — Medication 400 MILLIGRAM(S): at 15:42

## 2020-07-01 NOTE — PROGRESS NOTE ADULT - ASSESSMENT
S/P laparoscopic sleeve gastrectomy with upper endoscopy on 6/30/20  multimodal pain management  PT/OT  VTE PPx - Lovenox BID  advance diet per Surgery    HTN  vasotec with hold parameter   switch to oral when able to take PO    Morbid Obesity  YISSEL on CPAP  remote tele.   Pulm consult reviewed

## 2020-07-01 NOTE — PHARMACOTHERAPY INTERVENTION NOTE - COMMENTS
Patient is post Bariatric Sleeve  surgery .  Patient was Education on current home medication administration issues and meds that were sent from [facility] by the Meds to Bed Program. .  Patient and pharmacist reviewed use and side effects of medication. Pt expressed understanding. We  also reviewed  issues of Constipation  and the best treatment.  We reviewed what medication to avoid after bariatric surgery  and Why ( avoid NSAID).  Stressed  the MAX dose of APAP and proper narcotic use and   pain treatment. We reviewed how  to avoid problems and the main  side effects.  Stressed  the importance of hydration. I explained the need to  Avoid Alcohol. I answered all pt questions on review of Ondansetron, Percocet, Omeprazole, Acetaminophen liquid, Fusion Vitamins  and Home medications  told pt to avoid HCTZ due to possible risk and confirm use of ASA with MD   education sheet provided

## 2020-07-01 NOTE — PROGRESS NOTE ADULT - SUBJECTIVE AND OBJECTIVE BOX
PULMONARY/CRITICAL CARE    Doing well. No sob. Wearing cpap hs. Mild abd pain. Ambulated.     Patient is a 40y old  Male who presents with a chief complaint of 41 yo M  with PMHx of morbid obesity admitted to [facility] for scheduled laparoscopic sleeve gastrectomy and intra-operative EGD and hiatal hernia repair (30 Jun 2020 15:28)    BRIEF HOSPITAL COURSE: ***    Events last 24 hours: ***    PAST MEDICAL & SURGICAL HISTORY:  YISSEL on CPAP  Fatty liver  HTN (hypertension)  S/P cholecystectomy: 2014    Allergies    morphine (Other (Mild))  penicillins (Unknown)    Intolerances      FAMILY HISTORY/ social: no cigs, etoh.         Medications:    enalaprilat Injectable 1.25 milliGRAM(s) IV Push every 6 hours      acetaminophen  IVPB .. 1000 milliGRAM(s) IV Intermittent every 6 hours  HYDROmorphone  Injectable 0.5 milliGRAM(s) IV Push every 4 hours PRN  ibuprofen IVPB .. 800 milliGRAM(s) IV Intermittent every 6 hours PRN  ondansetron Injectable 4 milliGRAM(s) IV Push every 6 hours      enoxaparin Injectable 40 milliGRAM(s) SubCutaneous every 12 hours    hyoscyamine SL 0.125 milliGRAM(s) SubLingual every 6 hours PRN  pantoprazole  Injectable 40 milliGRAM(s) IV Push daily        lactated ringers. 1000 milliLiter(s) IV Continuous <Continuous>  lactated ringers. 2000 milliLiter(s) IV Continuous <Continuous>                ICU Vital Signs Last 24 Hrs  T(C): 36.7 (30 Jun 2020 16:45), Max: 36.9 (30 Jun 2020 13:53)  T(F): 98 (30 Jun 2020 16:45), Max: 98.4 (30 Jun 2020 13:53)  HR: 65 (30 Jun 2020 17:24) (63 - 80)  BP: 139/71 (30 Jun 2020 17:24) (138/74 - 156/89)  BP(mean): --  ABP: --  ABP(mean): --  RR: 16 (30 Jun 2020 17:24) (10 - 20)  SpO2: 98% (30 Jun 2020 17:24) (94% - 99%)    Vital Signs Last 24 Hrs  T(C): 36.7 (30 Jun 2020 16:45), Max: 36.9 (30 Jun 2020 13:53)  T(F): 98 (30 Jun 2020 16:45), Max: 98.4 (30 Jun 2020 13:53)  HR: 65 (30 Jun 2020 17:24) (63 - 80)  BP: 139/71 (30 Jun 2020 17:24) (138/74 - 156/89)  BP(mean): --  RR: 16 (30 Jun 2020 17:24) (10 - 20)  SpO2: 98% (30 Jun 2020 17:24) (94% - 99%)        I&O's Detail    30 Jun 2020 07:01  -  30 Jun 2020 17:29  --------------------------------------------------------  IN:    IV PiggyBack: 250 mL    lactated ringers.: 2450 mL  Total IN: 2700 mL    OUT:    Estimated Blood Loss: 15 mL    Voided: 500 mL  Total OUT: 515 mL    Total NET: 2185 mL            LABS:                CAPILLARY BLOOD GLUCOSE            CULTURES:      Physical Examination:    General: No acute distress.  obese male    HEENT: Pupils equal, reactive to light.  Symmetric.    PULM: Clear to auscultation bilaterally, no significant sputum production    CVS: Regular rate and rhythm, no murmurs, rubs, or gallops    ABD: Soft, nondistended, mildly tender, decreased bowel sounds, no masses    EXT: No edema, nontender    SKIN: Warm and well perfused, no rashes noted.    NEURO: Alert, oriented, interactive, nonfocal    RADIOLOGY: ***    CRITICAL CARE TIME SPENT: ***

## 2020-07-01 NOTE — PROGRESS NOTE ADULT - REASON FOR ADMISSION
41 yo M  with PMHx of morbid obesity admitted to [facility] for scheduled laparoscopic sleeve gastrectomy and intra-operative EGD and hiatal hernia repair (30 Jun 2020 15:28)

## 2020-07-01 NOTE — PROGRESS NOTE ADULT - SUBJECTIVE AND OBJECTIVE BOX
no events   pt on cpap   awake , alert and comfortable  VSS abd soft, mild tender       cv s1s2         chest air entry b/l  labs reviewed    a/p Post op day 1, lap sleeve gastrectomy and hiatal hernia repair    EGD in OR without bleeding or extravasation      lumen patent.      cont w/ daily acid suppression, zofran prn      ambulation encouraged      oral hydration reviewed  d/c plan per bariatrics

## 2020-07-01 NOTE — PROGRESS NOTE ADULT - SUBJECTIVE AND OBJECTIVE BOX
Pre-Op Dx: Morbid obesity  Procedure: laparoscopic sleeve gastrectomy with laparoscopic hiatal hernia repair and EGD  Surgeon: Percy    HPI:   40 year old Male s/p laparoscopic sleeve gastrectomy with laparoscopic hiatal hernia repair and EGD POD #1.  Patient is ambulating on the floor and utilizing incentive spirometry. He is tolerating clear liquid diet and urinating well. Minimal incisional discomfort. Denies any nausea or vomiting. Pt seen and examined at the bedside.     VITALS:  Vital Signs Last 24 Hrs  T(C): 36.8 (01 Jul 2020 07:37), Max: 36.9 (30 Jun 2020 13:53)  T(F): 98.3 (01 Jul 2020 07:37), Max: 98.5 (01 Jul 2020 03:44)  HR: 54 (01 Jul 2020 07:37) (46 - 80)  BP: 166/93 (01 Jul 2020 07:37) (138/74 - 166/93)  BP(mean): --  RR: 17 (01 Jul 2020 07:37) (14 - 20)  SpO2: 100% (01 Jul 2020 07:37) (94% - 100%)    06-30 @ 07:01  -  07-01 @ 07:00  --------------------------------------------------------  IN: 3250 mL / OUT: 1915 mL / NET: 1335 mL    07-01 @ 07:01 - 07-01 @ 11:19  --------------------------------------------------------  IN: 0 mL / OUT: 600 mL / NET: -600 mL          LABS:                        13.1   12.37 )-----------( 246      ( 01 Jul 2020 06:17 )             40.4     07-01    141  |  105  |  10  ----------------------------<  95  3.9   |  27  |  0.75    Ca    9.4      01 Jul 2020 06:17        Physical Exam:  General: A/O x 3, NAD, resting comfortably in bed  Abdominal: soft, ND, nontender to palpation, incisions C/D/I  Extremities: no edema, no calf tenderness B/L

## 2020-07-01 NOTE — PROGRESS NOTE ADULT - ASSESSMENT
40 year old Male POD #1 s/p laparoscopic sleeve gastrectomy with laparoscopic hiatal hernia repair and EGD is hemodynamically stable and doing well.

## 2020-07-01 NOTE — PROGRESS NOTE ADULT - ASSESSMENT
Pt. stable postop gastric sleeve.   Hx YISSEL.  Advised ambulate  Incentive marian  FU pulmonologist.

## 2020-07-01 NOTE — DISCHARGE NOTE NURSING/CASE MANAGEMENT/SOCIAL WORK - PATIENT PORTAL LINK FT
You can access the FollowMyHealth Patient Portal offered by [facility] by registering at the following website: [email or web address]. By joining [employer]’s FollowMyHealth portal, you will also be able to view your health information using other applications (apps) compatible with our system.

## 2020-07-01 NOTE — PROGRESS NOTE ADULT - SUBJECTIVE AND OBJECTIVE BOX
INTERVAL HPI/OVERNIGHT EVENTS:   Patient seen and examined.  Tolerated small amount of clears this morning.  No fevers, chills, sweats, dizziness, HA, changes in vision, cp, palpitations, sob, persistent cough, n/v/d,dysuria, focal weakness, or calf pain.   Ambulating to bathroom.    MEDICATIONS  (STANDING):  enalaprilat Injectable 1.25 milliGRAM(s) IV Push every 6 hours  enoxaparin Injectable 40 milliGRAM(s) SubCutaneous every 12 hours  lactated ringers. 1000 milliLiter(s) (150 mL/Hr) IV Continuous <Continuous>  lactated ringers. 2000 milliLiter(s) (1000 mL/Hr) IV Continuous <Continuous>  ondansetron Injectable 4 milliGRAM(s) IV Push every 6 hours  pantoprazole  Injectable 40 milliGRAM(s) IV Push daily    MEDICATIONS  (PRN):  acetaminophen  IVPB .. 1000 milliGRAM(s) IV Intermittent every 6 hours PRN Mild Pain (1 - 3)  HYDROmorphone  Injectable 0.5 milliGRAM(s) IV Push every 4 hours PRN Severe Pain (7 - 10)  hyoscyamine SL 0.125 milliGRAM(s) SubLingual every 6 hours PRN nausea/vomitting  ibuprofen IVPB .. 800 milliGRAM(s) IV Intermittent every 6 hours PRN Moderate Pain (4 - 6)      REVIEW OF SYSTEMS:  See HPI,  all others negative    PHYSICAL EXAM:  Vital Signs Last 24 Hrs  T(C): 36.8 (2020 07:37), Max: 36.9 (2020 13:53)  T(F): 98.3 (2020 07:37), Max: 98.5 (2020 03:44)  HR: 54 (2020 07:37) (46 - 80)  BP: 166/93 (2020 07:37) (138/74 - 166/93)  BP(mean): --  RR: 17 (2020 07:37) (10 - 20)  SpO2: 100% (2020 07:37) (94% - 100%)    GENERAL: NAD, well-groomed, well-developed, awake, alert, oriented x 3, fluent and coherent speech  EYES: EOMI, PERRLA, conjunctiva and sclera clear  NECK: Supple, No JVD, No Cervical LAD, No thyromegaly, No thyroid nodules felt  NERVOUS SYSTEM:  Good concentration; Moving all 4 extremities against gravity and resistance; No gross sensory deficits, No facial droop  CHEST WALL: No masses  CHEST/LUNG: Clear to auscultation bilaterally; No rales, rhonchi, wheezing, or rubs  HEART: Regular rate and rhythm; No murmurs, rubs, or gallops  ABDOMEN: Soft, Nontender, obese, Bowel sounds not heard, incision sites c/d/i, No palpable masses or organomegaly, No bruits  EXTREMITIES:  2+ Peripheral Pulses, No clubbing, cyanosis, or edema, no calf tenderness in either leg  LYMPH: No lymphadenopathy  SKIN: No rashes or lesions    Diagnostic Testin.1   12.37 )-----------( 246      ( 2020 06:17 )             40.4     2020 06:17    141    |  105    |  10     ----------------------------<  95     3.9     |  27     |  0.75     Ca    9.4        2020 06:17

## 2020-07-02 LAB — SURGICAL PATHOLOGY STUDY: SIGNIFICANT CHANGE UP

## 2020-07-07 ENCOUNTER — APPOINTMENT (OUTPATIENT)
Dept: BARIATRICS | Facility: CLINIC | Age: 40
End: 2020-07-07
Payer: MEDICAID

## 2020-07-07 VITALS — WEIGHT: 315 LBS | OXYGEN SATURATION: 98 % | HEIGHT: 69 IN | HEART RATE: 65 BPM | BODY MASS INDEX: 46.65 KG/M2

## 2020-07-07 PROCEDURE — 99024 POSTOP FOLLOW-UP VISIT: CPT

## 2020-07-07 RX ORDER — CALCIUM CARBONATE/VITAMIN D3 600 MG-10
TABLET ORAL
Refills: 0 | Status: COMPLETED | COMMUNITY
End: 2020-07-07

## 2020-07-07 RX ORDER — OXYCODONE AND ACETAMINOPHEN 5; 325 MG/1; MG/1
5-325 TABLET ORAL EVERY 8 HOURS
Qty: 6 | Refills: 0 | Status: COMPLETED | COMMUNITY
Start: 2020-06-23 | End: 2020-07-07

## 2020-07-07 RX ORDER — CHROMIUM 200 MCG
1000 TABLET ORAL DAILY
Refills: 0 | Status: COMPLETED | COMMUNITY
Start: 2019-07-29 | End: 2020-07-07

## 2020-07-07 RX ORDER — ONDANSETRON 4 MG/1
4 TABLET, ORALLY DISINTEGRATING ORAL 4 TIMES DAILY
Qty: 15 | Refills: 0 | Status: COMPLETED | COMMUNITY
Start: 2020-06-23 | End: 2020-07-07

## 2020-07-07 RX ORDER — MULTIVIT-MIN/IRON/FOLIC ACID/K 18-600-40
CAPSULE ORAL
Refills: 0 | Status: COMPLETED | COMMUNITY
End: 2020-07-07

## 2020-07-07 NOTE — DATA REVIEWED
[FreeTextEntry1] : Total opioids used       4         total # taken\par \par MSO4 equivalent          30        mg (total # taken). \par \par Unused opioids returned?   No\par \par Additional opioid refill?   No\par \par Patient was informed where to return unused opioids

## 2020-07-07 NOTE — HISTORY OF PRESENT ILLNESS
[Procedure: ___] : Procedure performed: [unfilled]  [Date of Surgery: ___] : Date of Surgery:   [unfilled] [Surgeon Name:   ___] : Surgeon Name: Dr. GOMEZ [Pre-Op Weight ___] : Pre-op weight was [unfilled] lbs [de-identified] : 40 year old male one week s/p laparoscopic sleeve gastrectomy and hiatal hernia repair presents today for first post operative visit. Patient lost 37 lbs since last visit. He is consuming the appropriate amount of protein and fluids daily. However, he reports dark urine, but he reports that he usually sweats daily. He takes omeprazole daily. Patient denies abdominal pain, acid reflux symptoms, nausea, vomiting, diarrhea and constipation. He is ambulating frequently for exercise. He continues to uses CPAP nightly.

## 2020-07-07 NOTE — ASSESSMENT
[FreeTextEntry1] : 40 year old male one week s/p laparoscopic sleeve gastrectomy and hiatal hernia repair presents for first post operative visit. He is doing well and losing weight. Incisions are healing appropriately. The patient was encouraged to continue a liquid low fat, low carbohydrate and high protein diet for another week and then progress to soft/pureed foods as tolerated.  It was also recommended that he increase water intake and limit time outside in heat for prolonged period of time. Patient was advised to increase ambulation and not to do any heavy lifting until 6 weeks post op.\par \par Nutrition and exercise counseling provided.\par Start Vitamins and continue omeprazole\par Follow up in 3 weeks\par Call with any questions or concerns

## 2020-08-02 ENCOUNTER — TRANSCRIPTION ENCOUNTER (OUTPATIENT)
Age: 40
End: 2020-08-02

## 2020-08-04 ENCOUNTER — APPOINTMENT (OUTPATIENT)
Dept: BARIATRICS | Facility: CLINIC | Age: 40
End: 2020-08-04
Payer: MEDICAID

## 2020-08-04 ENCOUNTER — APPOINTMENT (OUTPATIENT)
Dept: BARIATRICS/WEIGHT MGMT | Facility: CLINIC | Age: 40
End: 2020-08-04
Payer: SELF-PAY

## 2020-08-04 VITALS — BODY MASS INDEX: 46.65 KG/M2 | HEIGHT: 69 IN | WEIGHT: 315 LBS

## 2020-08-04 DIAGNOSIS — E66.01 MORBID (SEVERE) OBESITY DUE TO EXCESS CALORIES: ICD-10-CM

## 2020-08-04 DIAGNOSIS — R63.4 ABNORMAL WEIGHT LOSS: ICD-10-CM

## 2020-08-04 PROCEDURE — 97803 MED NUTRITION INDIV SUBSEQ: CPT | Mod: 95

## 2020-08-04 PROCEDURE — 99024 POSTOP FOLLOW-UP VISIT: CPT

## 2020-08-05 VITALS — WEIGHT: 315 LBS | HEIGHT: 69 IN | BODY MASS INDEX: 46.65 KG/M2

## 2020-08-05 PROBLEM — R63.4 WEIGHT LOSS: Status: ACTIVE | Noted: 2020-03-12

## 2020-08-05 NOTE — HISTORY OF PRESENT ILLNESS
[Procedure: ___] : Procedure performed: [unfilled]  [Date of Surgery: ___] : Date of Surgery:   [unfilled] [Surgeon Name:   ___] : Surgeon Name: Dr. GOMEZ [Pre-Op Weight ___] : Pre-op weight was [unfilled] lbs [de-identified] : 40 year old M 1 MONTH  s/p lap sleeve gastrectomy and intra- op EGD and hiatal hernia repair. Weight loss since last visit.Denies any abdominal discomfort. Denies any nausea. Pt states he is tolerating and advancing diet without any issues.Pt states he  is consuming a sufficient amount of zero calorie liquid per day. Pt is  taking MVI daily without any issues. No change in BM. No reflux symptoms. Exercising regularly at home. Plan to start strength training in 2 weeks.Pt is walking 10 K steps per day.  He continues to uses CPAP nightly.

## 2020-08-05 NOTE — REVIEW OF SYSTEMS
[Recent Change In Weight] : ~T recent weight change [Negative] : Endocrine [FreeTextEntry2] : weight loss

## 2020-08-05 NOTE — ASSESSMENT
[FreeTextEntry1] : 40 year old M 1 MONTH  s/p lap sleeve gastrectomy and intra- op EGD and hiatal hernia repair. Weight loss since last visit\par \par The patient was encouraged to continue a liquid low fat, low carbohydrate and high protein diet for another week and then progress to soft/pureed foods as tolerated.  It was also recommended that he increase water intake and limit time outside in heat for prolonged period of time. Patient was advised to increase ambulation . Plan to start strength training in 2 weeks. \par \par Nutrition and exercise counseling provided.\par Discussed and reviewed daily vitamins with patient.\par Plan to forward exercise packet to patient. \par Plan to schedule a one on one telemedicine visit with Maria Esther [facility]\par Follow up in 4  weeks\par Call with any questions or concerns

## 2020-08-09 ENCOUNTER — TRANSCRIPTION ENCOUNTER (OUTPATIENT)
Age: 40
End: 2020-08-09

## 2020-09-02 PROBLEM — G47.33 OBSTRUCTIVE SLEEP APNEA (ADULT) (PEDIATRIC): Chronic | Status: ACTIVE | Noted: 2020-06-25

## 2020-09-05 ENCOUNTER — OUTPATIENT (OUTPATIENT)
Dept: OUTPATIENT SERVICES | Facility: HOSPITAL | Age: 40
LOS: 1 days | End: 2020-09-05
Payer: MEDICAID

## 2020-09-05 DIAGNOSIS — Z11.59 ENCOUNTER FOR SCREENING FOR OTHER VIRAL DISEASES: ICD-10-CM

## 2020-09-05 DIAGNOSIS — Z90.49 ACQUIRED ABSENCE OF OTHER SPECIFIED PARTS OF DIGESTIVE TRACT: Chronic | ICD-10-CM

## 2020-09-05 PROCEDURE — U0003: CPT

## 2020-09-06 DIAGNOSIS — Z11.59 ENCOUNTER FOR SCREENING FOR OTHER VIRAL DISEASES: ICD-10-CM

## 2020-09-06 LAB — SARS-COV-2 RNA SPEC QL NAA+PROBE: SIGNIFICANT CHANGE UP

## 2020-09-15 ENCOUNTER — APPOINTMENT (OUTPATIENT)
Dept: OTOLARYNGOLOGY | Facility: CLINIC | Age: 40
End: 2020-09-15
Payer: MEDICAID

## 2020-09-15 VITALS
SYSTOLIC BLOOD PRESSURE: 142 MMHG | HEIGHT: 69 IN | HEART RATE: 53 BPM | WEIGHT: 315 LBS | TEMPERATURE: 97.9 F | DIASTOLIC BLOOD PRESSURE: 95 MMHG | BODY MASS INDEX: 46.65 KG/M2

## 2020-09-15 DIAGNOSIS — H60.543 ACUTE ECZEMATOID OTITIS EXTERNA, BILATERAL: ICD-10-CM

## 2020-09-15 DIAGNOSIS — H92.02 OTALGIA, LEFT EAR: ICD-10-CM

## 2020-09-15 DIAGNOSIS — M26.609 UNSPECIFIED TEMPOROMANDIBULAR JOINT DISORDER: ICD-10-CM

## 2020-09-15 DIAGNOSIS — H90.3 SENSORINEURAL HEARING LOSS, BILATERAL: ICD-10-CM

## 2020-09-15 PROCEDURE — 92557 COMPREHENSIVE HEARING TEST: CPT

## 2020-09-15 PROCEDURE — 92567 TYMPANOMETRY: CPT

## 2020-09-15 PROCEDURE — 99204 OFFICE O/P NEW MOD 45 MIN: CPT | Mod: 25

## 2020-09-15 NOTE — REVIEW OF SYSTEMS
[Sneezing] : sneezing [Seasonal Allergies] : seasonal allergies [Post Nasal Drip] : post nasal drip [Ear Pain] : ear pain [Nasal Congestion] : nasal congestion [Negative] : Heme/Lymph [FreeTextEntry1] : joint aches  muscle aches

## 2020-09-15 NOTE — PHYSICAL EXAM
[Midline] : trachea located in midline position [Normal] : no rashes [de-identified] : sl tender left tmj - otherwise negative  [de-identified] : dry eac bilat

## 2020-09-15 NOTE — HISTORY OF PRESENT ILLNESS
[de-identified] : c/o discomfort in left ear.  Occ sl tenderness around jaw.   Seen at Urgent Care and treated with ear drops x 2.  Problem for about 5 weeks.  Problem intermittent.  No change in hearing.  No balance issues.  ? occ tinnitus.

## 2020-09-15 NOTE — ASSESSMENT
[FreeTextEntry1] : Patient with occ left otalgia.  Exam unremarkable with findings consistent with tmj issues and eczema of eac.  Started on flucinolone ointment and advised to stop q tip use.  Also advised to use warm compresses and NSAIDS for tmj and if problems persist would recommend dental eval and possible oral appliance - follow up in 3 mos and as necessary a

## 2020-09-21 ENCOUNTER — RX RENEWAL (OUTPATIENT)
Age: 40
End: 2020-09-21

## 2020-09-27 ENCOUNTER — TRANSCRIPTION ENCOUNTER (OUTPATIENT)
Age: 40
End: 2020-09-27

## 2020-12-01 ENCOUNTER — RX RENEWAL (OUTPATIENT)
Age: 40
End: 2020-12-01

## 2020-12-30 ENCOUNTER — APPOINTMENT (OUTPATIENT)
Dept: INTERNAL MEDICINE | Facility: CLINIC | Age: 40
End: 2020-12-30

## 2021-01-15 ENCOUNTER — APPOINTMENT (OUTPATIENT)
Dept: INTERNAL MEDICINE | Facility: CLINIC | Age: 41
End: 2021-01-15
Payer: MEDICAID

## 2021-01-15 ENCOUNTER — NON-APPOINTMENT (OUTPATIENT)
Age: 41
End: 2021-01-15

## 2021-01-15 VITALS
BODY MASS INDEX: 41.77 KG/M2 | DIASTOLIC BLOOD PRESSURE: 88 MMHG | HEIGHT: 69 IN | TEMPERATURE: 97.5 F | SYSTOLIC BLOOD PRESSURE: 140 MMHG | OXYGEN SATURATION: 99 % | RESPIRATION RATE: 18 BRPM | WEIGHT: 282 LBS | HEART RATE: 64 BPM

## 2021-01-15 DIAGNOSIS — R20.2 PARESTHESIA OF SKIN: ICD-10-CM

## 2021-01-15 DIAGNOSIS — M25.552 PAIN IN LEFT HIP: ICD-10-CM

## 2021-01-15 DIAGNOSIS — Z98.84 BARIATRIC SURGERY STATUS: ICD-10-CM

## 2021-01-15 DIAGNOSIS — Z72.4 INAPPROPRIATE DIET AND EATING HABITS: ICD-10-CM

## 2021-01-15 DIAGNOSIS — Z86.19 PERSONAL HISTORY OF OTHER INFECTIOUS AND PARASITIC DISEASES: ICD-10-CM

## 2021-01-15 DIAGNOSIS — Z01.818 ENCOUNTER FOR OTHER PREPROCEDURAL EXAMINATION: ICD-10-CM

## 2021-01-15 PROCEDURE — 99072 ADDL SUPL MATRL&STAF TM PHE: CPT

## 2021-01-15 PROCEDURE — 99214 OFFICE O/P EST MOD 30 MIN: CPT

## 2021-01-15 RX ORDER — OMEPRAZOLE 20 MG/1
20 CAPSULE, DELAYED RELEASE ORAL DAILY
Qty: 30 | Refills: 1 | Status: DISCONTINUED | COMMUNITY
Start: 2020-06-23 | End: 2021-01-15

## 2021-01-15 NOTE — HISTORY OF PRESENT ILLNESS
[FreeTextEntry1] : F/up  [de-identified] : Pt here  for follow up. He is s/p lap sleeve gastrectomy and intra op hernia repair 6/30/21 by . He is tolerating regular diet without difficulty. He has been doing telehealth with a registered Dietician form Dr. Gonzalez's office. He is also walking 10,000 steps daily . He refuses flu shot. \par Denies fever, chill,s cough,shortness of breath, chest pain, palpations.

## 2021-01-15 NOTE — PHYSICAL EXAM
[No Acute Distress] : no acute distress [Well Nourished] : well nourished [Well Developed] : well developed [No Respiratory Distress] : no respiratory distress  [No Accessory Muscle Use] : no accessory muscle use [Clear to Auscultation] : lungs were clear to auscultation bilaterally [Regular Rhythm] : with a regular rhythm [Normal Rate] : normal rate  [Normal S1, S2] : normal S1 and S2 [Soft] : abdomen soft [Non Tender] : non-tender [Non-distended] : non-distended [No Focal Deficits] : no focal deficits [Normal Affect] : the affect was normal [Normal Gait] : normal gait [Alert and Oriented x3] : oriented to person, place, and time [Normal Insight/Judgement] : insight and judgment were intact [de-identified] : obese ,

## 2021-01-15 NOTE — ASSESSMENT
[FreeTextEntry1] : Continue current medications, renwed a s requested \par FBW script given \par Continue exercise as tolerated, low fat/ low chol diet \par F/up 6 months OV

## 2021-01-15 NOTE — REVIEW OF SYSTEMS
[Fever] : no fever [Chills] : no chills [Fatigue] : no fatigue [Negative] : Psychiatric [FreeTextEntry7] : see HPI

## 2021-01-26 ENCOUNTER — NON-APPOINTMENT (OUTPATIENT)
Age: 41
End: 2021-01-26

## 2021-01-26 LAB
24R-OH-CALCIDIOL SERPL-MCNC: 50.1 PG/ML
ALBUMIN SERPL ELPH-MCNC: 4.2 G/DL
ALP BLD-CCNC: 83 U/L
ALT SERPL-CCNC: 12 U/L
ANION GAP SERPL CALC-SCNC: 12 MMOL/L
AST SERPL-CCNC: 10 U/L
BASOPHILS # BLD AUTO: 0.04 K/UL
BASOPHILS NFR BLD AUTO: 0.7 %
BILIRUB SERPL-MCNC: 1 MG/DL
BUN SERPL-MCNC: 17 MG/DL
CALCIUM SERPL-MCNC: 9.6 MG/DL
CHLORIDE SERPL-SCNC: 104 MMOL/L
CHOLEST SERPL-MCNC: 146 MG/DL
CO2 SERPL-SCNC: 27 MMOL/L
CREAT SERPL-MCNC: 0.75 MG/DL
EOSINOPHIL # BLD AUTO: 0.07 K/UL
EOSINOPHIL NFR BLD AUTO: 1.2 %
GLUCOSE SERPL-MCNC: 77 MG/DL
HCT VFR BLD CALC: 41.7 %
HDLC SERPL-MCNC: 49 MG/DL
HGB BLD-MCNC: 13.5 G/DL
IMM GRANULOCYTES NFR BLD AUTO: 0.2 %
LDLC SERPL CALC-MCNC: 83 MG/DL
LYMPHOCYTES # BLD AUTO: 1.97 K/UL
LYMPHOCYTES NFR BLD AUTO: 32.5 %
MAN DIFF?: NORMAL
MCHC RBC-ENTMCNC: 30.6 PG
MCHC RBC-ENTMCNC: 32.4 GM/DL
MCV RBC AUTO: 94.6 FL
MONOCYTES # BLD AUTO: 0.34 K/UL
MONOCYTES NFR BLD AUTO: 5.6 %
NEUTROPHILS # BLD AUTO: 3.64 K/UL
NEUTROPHILS NFR BLD AUTO: 59.8 %
NONHDLC SERPL-MCNC: 97 MG/DL
PLATELET # BLD AUTO: 268 K/UL
POTASSIUM SERPL-SCNC: 4.2 MMOL/L
PROT SERPL-MCNC: 6.9 G/DL
RBC # BLD: 4.41 M/UL
RBC # FLD: 12.3 %
SODIUM SERPL-SCNC: 143 MMOL/L
T4 FREE SERPL-MCNC: 1.2 NG/DL
TRIGL SERPL-MCNC: 68 MG/DL
TSH SERPL-ACNC: 1.21 UIU/ML
WBC # FLD AUTO: 6.07 K/UL

## 2021-07-07 NOTE — ED CDU PROVIDER INITIAL DAY NOTE - CONSTITUTIONAL NEGATIVE STATEMENT, MLM
no fever and no chills. Niacinamide Counseling: I recommended taking niacin or niacinamide, also know as vitamin B3, twice daily. Recent evidence suggests that taking vitamin B3 (500 mg twice daily) can reduce the risk of actinic keratoses and non-melanoma skin cancers. Side effects of vitamin B3 include flushing and headache.

## 2021-10-01 ENCOUNTER — RX RENEWAL (OUTPATIENT)
Age: 41
End: 2021-10-01

## 2021-11-08 ENCOUNTER — RX RENEWAL (OUTPATIENT)
Age: 41
End: 2021-11-08

## 2021-12-06 NOTE — REVIEW OF SYSTEMS
[Recent Change In Weight] : ~T recent weight change [Negative] : Psychiatric [Fever] : no fever [Chills] : no chills [Night Sweats] : no night sweats [Fatigue] : no fatigue [Eye Pain] : no eye pain [Vision Problems] : no vision problems [Red Eyes] : eyes not red [Dysphagia] : no dysphagia [Hoarseness] : no hoarseness [Odynophagia] : no odynophagia [Chest Pain] : no chest pain [Leg Claudication] : no intermittent leg claudication [Palpitations] : no palpitations [Lower Ext Edema] : no lower extremity edema [Shortness Of Breath] : no shortness of breath [Wheezing] : no wheezing [Cough] : no cough [FreeTextEntry2] : intentional weight loss Mart-1 - Negative Histology Text: MART-1 staining demonstrates a normal density and pattern of melanocytes along the dermal-epidermal junction. The surgical margins are negative for tumor cells.

## 2022-01-05 ENCOUNTER — RX RENEWAL (OUTPATIENT)
Age: 42
End: 2022-01-05

## 2022-01-17 ENCOUNTER — RX RENEWAL (OUTPATIENT)
Age: 42
End: 2022-01-17

## 2022-01-21 ENCOUNTER — APPOINTMENT (OUTPATIENT)
Dept: INTERNAL MEDICINE | Facility: CLINIC | Age: 42
End: 2022-01-21

## 2022-02-09 NOTE — ED PROVIDER NOTE - CONSULTING PHYSICIAN
02/08/22 2343   NIV Type   Equipment Type v60   Mode Bilevel   Mask Type Full face mask   Mask Size Small   Settings/Measurements   IPAP 14 cmH20   CPAP/EPAP 7 cmH2O   Rate Ordered 14   Resp 20   FiO2  50 %   Vt Exhaled 427 mL   Minute Volume 10.1 Liters   Mask Leak (lpm) 22 lpm   Comfort Level Good   Using Accessory Muscles No   SpO2 97   Alarm Settings   Alarms On Y   Press Low Alarm 8 cmH2O   High Pressure Alarm 40 cmH2O   Delay Alarm 20 sec(s)   Resp Rate Low Alarm 12   High Respiratory Rate 40 br/min   Vitals   SpO2 97 % ENT

## 2022-02-24 NOTE — H&P PST ADULT - NSCAFFEINETYPE_GEN_ALL_CORE_SD
Goal Outcome Evaluation:    Plan of Care Reviewed With: patient     Overall Patient Progress: improving     Patient slept late this shift. He was observed reading a book. No complaints of pain on his right knee. He slept a total of 4.75 hours only the whole night.              coffee

## 2022-04-18 ENCOUNTER — RX RENEWAL (OUTPATIENT)
Age: 42
End: 2022-04-18

## 2022-05-30 ENCOUNTER — RX RENEWAL (OUTPATIENT)
Age: 42
End: 2022-05-30

## 2022-06-06 ENCOUNTER — RX RENEWAL (OUTPATIENT)
Age: 42
End: 2022-06-06

## 2022-09-02 ENCOUNTER — RX RENEWAL (OUTPATIENT)
Age: 42
End: 2022-09-02

## 2022-12-20 ENCOUNTER — APPOINTMENT (OUTPATIENT)
Dept: INTERNAL MEDICINE | Facility: CLINIC | Age: 42
End: 2022-12-20

## 2022-12-20 VITALS
HEIGHT: 69 IN | SYSTOLIC BLOOD PRESSURE: 140 MMHG | WEIGHT: 244 LBS | OXYGEN SATURATION: 98 % | BODY MASS INDEX: 36.14 KG/M2 | HEART RATE: 82 BPM | DIASTOLIC BLOOD PRESSURE: 100 MMHG | TEMPERATURE: 98.5 F

## 2022-12-20 VITALS — SYSTOLIC BLOOD PRESSURE: 138 MMHG | DIASTOLIC BLOOD PRESSURE: 88 MMHG

## 2022-12-20 PROCEDURE — 99214 OFFICE O/P EST MOD 30 MIN: CPT

## 2022-12-20 RX ORDER — FLUOCINOLONE ACETONIDE 0.25 MG/G
0.03 OINTMENT TOPICAL TWICE DAILY
Qty: 1 | Refills: 2 | Status: DISCONTINUED | COMMUNITY
Start: 2020-09-15 | End: 2022-12-20

## 2022-12-20 RX ORDER — HYDROCHLOROTHIAZIDE 12.5 MG/1
12.5 TABLET ORAL
Refills: 0 | Status: DISCONTINUED | COMMUNITY
End: 2022-12-20

## 2022-12-20 NOTE — HISTORY OF PRESENT ILLNESS
[FreeTextEntry1] : FU [de-identified] : MARY LOU CARRION is a 42 year M who comes in for a follow up visit.\par He was previously following with  and last seen almost 2 years ago.\par He has hx of Htn, s/p lap sleeve gastrectomy and intra op hernia repair, YISSEL, left hip severe osteoarthritis comes in to establish care. \par He last had CT of left hip in 2020 and was to have intra articular injection but had bariatric surgery at that time.  Patient has on and off pain of the left hip and takes Tylenol as needed.\par Pt noticed right foot great toe with black lesion, non tender and right foot 3rd digit with mild pain at nail plate x few months. \par Patient denies any cp, sob,abdominal pain, nausea, vomiting, palpitations, fever, chills, constipation, diarrhea.\par

## 2022-12-20 NOTE — ASSESSMENT
[FreeTextEntry1] : 1.left hip osteoarthritis: Previously seen by orthopedics and CT hip done at that time.  Will refer back to orthopedics for intra-articular injection.  May need MRI versus physical therapy.  Continue with Tylenol for pain relief.\par \par 2.hypertension: Not well controlled.  Change enalapril 20 mg to lisinopril 30 mg once daily and continue on HCTZ once daily 25 mg. Check blood pressure daily, if greater than 150/90, call MD. Keep 2 gm low sodium diet, exercise as tolerated.\par Follow-up in 4 to 6 weeks.\par \par 3.right toe lesion: Given referral to podiatry as well as for ingrown nail.\par \par 3.health maintenance: Discussed fasting blood work to get.\par Return for CPE next month.

## 2022-12-21 ENCOUNTER — NON-APPOINTMENT (OUTPATIENT)
Age: 42
End: 2022-12-21

## 2022-12-21 LAB
ALBUMIN SERPL ELPH-MCNC: 4.4 G/DL
ALP BLD-CCNC: 74 U/L
ALT SERPL-CCNC: 26 U/L
ANION GAP SERPL CALC-SCNC: 9 MMOL/L
AST SERPL-CCNC: 17 U/L
BASOPHILS # BLD AUTO: 0.03 K/UL
BASOPHILS NFR BLD AUTO: 0.5 %
BILIRUB SERPL-MCNC: 1.1 MG/DL
BUN SERPL-MCNC: 15 MG/DL
CALCIUM SERPL-MCNC: 9.6 MG/DL
CHLORIDE SERPL-SCNC: 104 MMOL/L
CHOLEST SERPL-MCNC: 189 MG/DL
CO2 SERPL-SCNC: 29 MMOL/L
CREAT SERPL-MCNC: 0.66 MG/DL
EGFR: 120 ML/MIN/1.73M2
EOSINOPHIL # BLD AUTO: 0.07 K/UL
EOSINOPHIL NFR BLD AUTO: 1.2 %
ESTIMATED AVERAGE GLUCOSE: 80 MG/DL
GLUCOSE SERPL-MCNC: 84 MG/DL
HBA1C MFR BLD HPLC: 4.4 %
HCT VFR BLD CALC: 49 %
HDLC SERPL-MCNC: 61 MG/DL
HGB BLD-MCNC: 15.4 G/DL
IMM GRANULOCYTES NFR BLD AUTO: 0.9 %
LDLC SERPL CALC-MCNC: 110 MG/DL
LYMPHOCYTES # BLD AUTO: 1.74 K/UL
LYMPHOCYTES NFR BLD AUTO: 29.6 %
MAN DIFF?: NORMAL
MCHC RBC-ENTMCNC: 30.7 PG
MCHC RBC-ENTMCNC: 31.4 GM/DL
MCV RBC AUTO: 97.6 FL
MONOCYTES # BLD AUTO: 0.34 K/UL
MONOCYTES NFR BLD AUTO: 5.8 %
NEUTROPHILS # BLD AUTO: 3.65 K/UL
NEUTROPHILS NFR BLD AUTO: 62 %
NONHDLC SERPL-MCNC: 128 MG/DL
PLATELET # BLD AUTO: 252 K/UL
POTASSIUM SERPL-SCNC: 4.1 MMOL/L
PROT SERPL-MCNC: 7.4 G/DL
RBC # BLD: 5.02 M/UL
RBC # FLD: 12 %
SODIUM SERPL-SCNC: 142 MMOL/L
TRIGL SERPL-MCNC: 89 MG/DL
TSH SERPL-ACNC: 1.56 UIU/ML
WBC # FLD AUTO: 5.88 K/UL

## 2022-12-28 ENCOUNTER — RESULT CHARGE (OUTPATIENT)
Age: 42
End: 2022-12-28

## 2022-12-29 ENCOUNTER — APPOINTMENT (OUTPATIENT)
Dept: ORTHOPEDIC SURGERY | Facility: CLINIC | Age: 42
End: 2022-12-29
Payer: MEDICAID

## 2022-12-29 VITALS
BODY MASS INDEX: 36.14 KG/M2 | DIASTOLIC BLOOD PRESSURE: 88 MMHG | HEIGHT: 69 IN | HEART RATE: 71 BPM | WEIGHT: 244 LBS | SYSTOLIC BLOOD PRESSURE: 132 MMHG

## 2022-12-29 PROCEDURE — 73502 X-RAY EXAM HIP UNI 2-3 VIEWS: CPT | Mod: LT

## 2022-12-29 PROCEDURE — 99213 OFFICE O/P EST LOW 20 MIN: CPT

## 2023-01-03 ENCOUNTER — RESULT REVIEW (OUTPATIENT)
Age: 43
End: 2023-01-03

## 2023-01-03 ENCOUNTER — APPOINTMENT (OUTPATIENT)
Dept: ULTRASOUND IMAGING | Facility: CLINIC | Age: 43
End: 2023-01-03
Payer: MEDICAID

## 2023-01-03 ENCOUNTER — OUTPATIENT (OUTPATIENT)
Dept: OUTPATIENT SERVICES | Facility: HOSPITAL | Age: 43
LOS: 1 days | End: 2023-01-03
Payer: MEDICAID

## 2023-01-03 DIAGNOSIS — Z90.49 ACQUIRED ABSENCE OF OTHER SPECIFIED PARTS OF DIGESTIVE TRACT: Chronic | ICD-10-CM

## 2023-01-03 DIAGNOSIS — Z00.8 ENCOUNTER FOR OTHER GENERAL EXAMINATION: ICD-10-CM

## 2023-01-03 DIAGNOSIS — M16.12 UNILATERAL PRIMARY OSTEOARTHRITIS, LEFT HIP: ICD-10-CM

## 2023-01-03 PROCEDURE — 20611 DRAIN/INJ JOINT/BURSA W/US: CPT

## 2023-01-03 PROCEDURE — 20611 DRAIN/INJ JOINT/BURSA W/US: CPT | Mod: LT

## 2023-01-09 VITALS
DIASTOLIC BLOOD PRESSURE: 88 MMHG | BODY MASS INDEX: 36.14 KG/M2 | WEIGHT: 244 LBS | HEIGHT: 69 IN | HEART RATE: 71 BPM | SYSTOLIC BLOOD PRESSURE: 132 MMHG

## 2023-01-09 NOTE — DISCUSSION/SUMMARY
[de-identified] : The patient presents with end-stage osteoarthritis of the left hip.  I had a long discussion with the patient and he wants to go for an intra-articular injection.  He cannot proceed with hip replacement at this point.  He has been instructed on the natural history, the need for total hip arthroplasty, as well as, the risk of infection and for it be done three months after the injection.  He states he understands this and will be scheduled at this time.  He will be referred to Dr. Cervantes.

## 2023-01-09 NOTE — PHYSICAL EXAM
[de-identified] : Right Hip:\par Hip: Range of Motion in Degrees:\par 	                                 Claimant:	   Normal:	\par Flexion (Active) 	                 120 	   120-degrees	\par Flexion (Passive)	                 120	   120-degrees	\par Extension (Active)	                 -30	   -30-degrees	\par Extension (Passive)	 -30	   -30-degrees	\par Abduction (Active)	                 45-50	   24-93-rxirenf	\par Abduction (Passive)	 45-50	   93-48-[id number]	\par Adduction (Active)  	 20-30	   70-33-pwimycw	\par Adduction (Passive)	 20-30	   90-92-qzbcreh	\par Internal Rotation (Active) 	 35	   35-degrees	\par Internal Rotation (Passive)	 35	   35-degrees	\par External Rotation (Active)	 45	   45-degrees	\par External Rotation (Passive)	 45	   45-degrees	\par \par No tenderness with internal or external rotation or axial load.  No tenderness to palpation over the greater trochanter.  Negative Trendelenburg.  No tenderness with resisted abduction.  No weakness to flexion, extension, abduction or adduction.  No evidence of instability.  No motor or sensory deficits.  2+ DP and PT pulses.  Skin is intact.  No scars, rashes or lesions.  \par  \par Left Hip:\par -15 degrees internal rotation.  10 degree flexion contracture.  Tenderness into the groin with internal and external rotation and axial load.  No tenderness to palpation over the greater trochanter.  Negative Trendelenburg.  No tenderness with resisted abduction.  No weakness to flexion, extension, abduction or adduction.  No evidence of instability.  No motor or sensory deficits.  2+ DP and PT pulses.  Skin is intact.  No scars, rashes or lesions.  \par   [de-identified] : Gait and Station:  Ambulating with a slightly antalgic to antalgic gait.  Station:  Normal.  [de-identified] : Appearance:  Well-developed, well-nourished male in no acute distress.\par   [de-identified] : Radiographs, two to three views of the left hip with pelvis taken in the office today, show end-stage osteoarthritis.

## 2023-01-09 NOTE — HISTORY OF PRESENT ILLNESS
[de-identified] : The patient comes in today with persistent complaints to his left hip.  Since he was last seen, he has lost 130 pounds. child(davion)/spouse

## 2023-01-09 NOTE — CONSULT LETTER
[Dear  ___] : Dear  [unfilled], [Referral Letter:] : I am referring [unfilled] to you for further evaluation.  My most recent evaluation follows. [Referral Closing:] : Thank you very much for seeing this patient.  If you have any questions, please do not hesitate to contact me. [Sincerely,] : Sincerely, [FreeTextEntry3] : Donato Campbell, III, MD\par

## 2023-01-09 NOTE — ADDENDUM
[FreeTextEntry1] : This note was written by Zaida Echols on 01/09/2023 acting as scribe for Donato Campbell III, MD

## 2023-01-12 ENCOUNTER — APPOINTMENT (OUTPATIENT)
Dept: ORTHOPEDIC SURGERY | Facility: CLINIC | Age: 43
End: 2023-01-12
Payer: MEDICAID

## 2023-01-12 ENCOUNTER — NON-APPOINTMENT (OUTPATIENT)
Age: 43
End: 2023-01-12

## 2023-01-12 VITALS
WEIGHT: 224 LBS | DIASTOLIC BLOOD PRESSURE: 84 MMHG | HEIGHT: 69 IN | BODY MASS INDEX: 33.18 KG/M2 | SYSTOLIC BLOOD PRESSURE: 125 MMHG | HEART RATE: 57 BPM

## 2023-01-12 PROCEDURE — 99215 OFFICE O/P EST HI 40 MIN: CPT

## 2023-01-12 NOTE — DISCUSSION/SUMMARY
[de-identified] : Left hip end-stage osteoarthritis\par \par Extensive discussion of the natural history of this issue was had with the patient.  We discussed the treatment options focusing on conservative therapy which includes anti-inflammatories, physical therapy/home exercise, & activity modification.  This patient has failed conservative treatment he would like a total hip replacement however he would like to wait until the end of the year.  A prescription for meloxicam was sent to his pharmacy to help with the pain.  Recommend patient stay active and continue to keep his weight down.  Patient should follow-up in September to start planning for the surgery.  All questions answered.

## 2023-01-12 NOTE — PHYSICAL EXAM
[de-identified] : General Appearance: normal without acute distress\par Mental: Alert and oriented x 3\par Psych/affect: appropriate, cooperative\par Gait: Antalgic gait\par \par Left hip: \par Pain with deep flexion and IR.  Extremely limited internal or external rotation\par Straight leg raise: Negative\par Contralateral Hip: Normal ROM without pain on IR/ER\par Grossly normal motor and sensory examination of bilateral lower extremities [de-identified] : AP Pelvis and 2 views of the left hip taken at Ella Campbell's office were reviewed and demonstrate degenerative joint disease of the hip with joint space narrowing, osteophyte formation, and subchondral sclerosis.

## 2023-01-12 NOTE — HISTORY OF PRESENT ILLNESS
[de-identified] : Patient presents with left hip pain that been going on for years, referred by Dr. Campbell.  States he is walking with a limp and cannot put on his shoes and socks on without assistance.  States the pain is every day and is really limiting his activities.  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  Recently had a steroid injection January 3 which she states provided some relief that day but no lasting relief.  Denies any radiating pain or numbness or tingling.  Does not use any walking aids.  Does take Tylenol for the pain.  Had bariatric surgery June 2020 and is lost significant amount of weight.  Patient lives with his wife and kids at home, split ranch stairs to get in.  States he was told he is allergy to penicillin as a kid but does not remember exactly what happens.  When he takes morphine he throws up.  Denies history of blood clots or blood thinners, social alcohol use.  Casually smokes tobacco approximately half pack per week.

## 2023-01-19 ENCOUNTER — RX RENEWAL (OUTPATIENT)
Age: 43
End: 2023-01-19

## 2023-01-27 NOTE — CONSULT NOTE ADULT - CONSULT REASON
I understand from your nurse youll send in a special exception for this medicine. Even with the exception it will be best to order through [facility] for a  90 day supply than a monthly prescription from [facility]. The prescription sent to [facility] has been put on hold until I call. I had a long conversation my cousin, on my mothers side, who also suffered with migraines. She was diagnosed with Dystonia and Torticolis, specifically Spasmodic. Torticolis.
Post op care.
Sleep apnea  Morbid obesity

## 2023-03-01 NOTE — BRIEF OPERATIVE NOTE - CONDITION POST OP
Extubated, Stable
Pt BIBEMS from clinic for evaluation of allergic reaction. Pt had depo shot for first time PTA and c/o rash, dizziness, and  vomiting after.

## 2023-05-01 ENCOUNTER — RX RENEWAL (OUTPATIENT)
Age: 43
End: 2023-05-01

## 2023-05-19 ENCOUNTER — APPOINTMENT (OUTPATIENT)
Dept: INTERNAL MEDICINE | Facility: CLINIC | Age: 43
End: 2023-05-19
Payer: MEDICAID

## 2023-05-19 VITALS
TEMPERATURE: 98.7 F | WEIGHT: 247 LBS | BODY MASS INDEX: 36.58 KG/M2 | HEIGHT: 69 IN | OXYGEN SATURATION: 98 % | SYSTOLIC BLOOD PRESSURE: 140 MMHG | HEART RATE: 75 BPM | DIASTOLIC BLOOD PRESSURE: 102 MMHG

## 2023-05-19 VITALS — DIASTOLIC BLOOD PRESSURE: 88 MMHG | SYSTOLIC BLOOD PRESSURE: 138 MMHG

## 2023-05-19 PROCEDURE — 99396 PREV VISIT EST AGE 40-64: CPT

## 2023-05-19 RX ORDER — ENALAPRIL MALEATE 20 MG/1
20 TABLET ORAL
Qty: 30 | Refills: 0 | Status: DISCONTINUED | COMMUNITY
Start: 2019-12-16 | End: 2023-05-19

## 2023-05-19 NOTE — HISTORY OF PRESENT ILLNESS
[FreeTextEntry1] : CPE [de-identified] : MARY LOU CARRION is a 43 year M who comes in for an annual physical exam.\par Pt with hx of hnt, s/p sleeve gastrectomy and intra op hernia repair, YISSEL, severe left hip OA.\par He has not had any SE since change from enalapril to lisinopril.  Blood pressure is high in the office today.\par Patient has since followed up with orthopedics for his left hip severe osteoarthritis and will need hip replacement this year.\par Patient denies any cp, sob,abdominal pain, nausea, vomiting, palpitations, fever, chills, constipation, diarrhea.\par

## 2023-05-19 NOTE — ASSESSMENT
[FreeTextEntry1] : 1.left hip osteoarthritis: End-stage, patient has failed conservative therapies and will have total hip replacement this year.  He will continue on meloxicam 15 mg as needed and follow-up with orthopedics.\par \par 2.hypertension: Not well controlled.  Increase lisinopril to 40 mg daily continue on hydrochlorothiazide 25 mg, advised to check blood pressure at home. Check blood pressure daily, if greater than 150/90, call MD. Keep 2 gm low sodium diet, exercise as tolerated.\par Follow-up in 2 months.\par \par 3.health maintenance: Discussed fasting blood work to get.\par Patient counseled regarding recommendations for vaccines, diet and exercise and all preventative screening.\par

## 2023-05-19 NOTE — HEALTH RISK ASSESSMENT
[Yes] : Yes [Monthly or less (1 pt)] : Monthly or less (1 point) [1 or 2 (0 pts)] : 1 or 2 (0 points) [Never (0 pts)] : Never (0 points) [No] : In the past 12 months have you used drugs other than those required for medical reasons? No [0] : 2) Feeling down, depressed, or hopeless: Not at all (0) [Current] : Current [0-4] : 0-4 [PHQ-2 Negative - No further assessment needed] : PHQ-2 Negative - No further assessment needed [I have developed a follow-up plan documented below in the note.] : I have developed a follow-up plan documented below in the note. [[id number]] : 0 [EyeExamDate] : 01/2018

## 2023-05-30 ENCOUNTER — RX RENEWAL (OUTPATIENT)
Age: 43
End: 2023-05-30

## 2023-06-01 ENCOUNTER — RX RENEWAL (OUTPATIENT)
Age: 43
End: 2023-06-01

## 2023-06-02 ENCOUNTER — TRANSCRIPTION ENCOUNTER (OUTPATIENT)
Age: 43
End: 2023-06-02

## 2023-06-05 LAB
ANION GAP SERPL CALC-SCNC: 14 MMOL/L
BUN SERPL-MCNC: 18 MG/DL
CALCIUM SERPL-MCNC: 10 MG/DL
CHLORIDE SERPL-SCNC: 105 MMOL/L
CHOLEST SERPL-MCNC: 190 MG/DL
CO2 SERPL-SCNC: 26 MMOL/L
CREAT SERPL-MCNC: 0.72 MG/DL
EGFR: 116 ML/MIN/1.73M2
GLUCOSE SERPL-MCNC: 79 MG/DL
HDLC SERPL-MCNC: 68 MG/DL
LDLC SERPL CALC-MCNC: 105 MG/DL
NONHDLC SERPL-MCNC: 122 MG/DL
POTASSIUM SERPL-SCNC: 4.1 MMOL/L
SODIUM SERPL-SCNC: 145 MMOL/L
TRIGL SERPL-MCNC: 85 MG/DL

## 2023-06-06 ENCOUNTER — NON-APPOINTMENT (OUTPATIENT)
Age: 43
End: 2023-06-06

## 2023-06-09 ENCOUNTER — APPOINTMENT (OUTPATIENT)
Dept: ORTHOPEDIC SURGERY | Facility: CLINIC | Age: 43
End: 2023-06-09
Payer: MEDICAID

## 2023-06-09 PROCEDURE — 99215 OFFICE O/P EST HI 40 MIN: CPT

## 2023-06-09 PROCEDURE — 72170 X-RAY EXAM OF PELVIS: CPT

## 2023-06-09 PROCEDURE — 72100 X-RAY EXAM L-S SPINE 2/3 VWS: CPT

## 2023-06-09 NOTE — PHYSICAL EXAM
[de-identified] : Left hip: \par Pain with deep flexion and IR.  Extremely limited internal or external rotation, unable to internally rotate past 10 degrees of external rotation\par Straight leg raise: Negative\par Contralateral Hip: Normal ROM without pain on IR/ER\par Grossly normal motor and sensory examination of bilateral lower extremities\par Slightly shortened extremity few millimeters, 10 degree flexion contracture [de-identified] : 6/9/2023–sit/stand lumbar spine–standing sacral slope 40.1 degrees, sitting sacral slope 5.2 degrees\par AP pelvis– degenerative joint disease of the hip with joint space narrowing, severe osteophyte formation and deformity, and subchondral sclerosis.\par 1/12/2023–AP Pelvis and 2 views of the left hip taken at Ella Campbell's office were reviewed and demonstrate degenerative joint disease of the hip with joint space narrowing, osteophyte formation, and subchondral sclerosis.

## 2023-06-09 NOTE — HISTORY OF PRESENT ILLNESS
[de-identified] : 6/9/2023–patient here for follow-up left hip pain.  Here is with his wife, would like to discuss the surgery further.  His wife states she wants him to get surgery sooner and that he cannot wait until the end of the year.  States he has significant limp while walking.  This pain is severely limiting him daily.\par \par 1/12/2023–patient presents with left hip pain that been going on for years, referred by Dr. Campbell.  States he is walking with a limp and cannot put on his shoes and socks on without assistance.  States the pain is every day and is really limiting his activities.  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  Recently had a steroid injection January 3 which she states provided some relief that day but no lasting relief.  Denies any radiating pain or numbness or tingling.  Does not use any walking aids.  Does take Tylenol for the pain.  Had bariatric surgery June 2020 and is lost significant amount of weight.  Patient lives with his wife and kids at home, split ranch stairs to get in.  States he was told he is allergy to penicillin as a kid but does not remember exactly what happens.  When he takes morphine he throws up.  Denies history of blood clots or blood thinners, social alcohol use.  Casually smokes tobacco approximately half pack per week.

## 2023-06-09 NOTE — DISCUSSION/SUMMARY
[de-identified] : Left hip arthritis with limitations of activities of daily living and conservative treatment options failing to improve disability.\par \par Plan: Left total Hip Arthroplasty\par \par The patient has arthritis/end stage joint disease of the hip supported by x-ray or MRI that demonstrated one of the following:  periarticular osteophytes; joint space narrowing; avascular necrosis; subchondral cysts; subchondral sclerosis; or bone on bone articulation;\par \par One or more of the following conservative treatments have been tried and failed for 3 months or more: analgesic medication; home exercises; brace; cortisone shots; anti-inflammatory medication; physical therapy;\par \par We discussed the natural history of hip arthritis and the potential treatment options. The importance of diet and exercise was discussed as excess weight is a significant contributing factor. Due to the pain, failure of prior nonoperative treatment and associated disability, the patient is indicated for a total hip replacement. \par \par A risk/benefit analysis was discussed with the patient reviewing the advantages and disadvantages of surgical intervention at this time. Both the level and length of the patient's pain have made additional conservative treatment measures contraindicated. A full explanation was given of the nature and the purpose of the procedure and anesthesia, its benefits, possible alternative methods of treatment, the risks involved, the possibility of complications, the foreseeable consequences of the procedure and the possible results of the non-treatment. I reviewed the plan of care as well as a model of a total hip implant equivalent to the one that will be used for their total hip replacement. The patient agrees with the plan of care as well as the use of implants for their replacement. We also discussed that if robotic/computer navigation is utilized, then additional incisions may need to be made to accommodate the computer navigation arrays.\par \par The potential biologic and mechanical risks of the procedure were discussed. This discussion included but was not limited to thromboembolic disease, fracture, loss of fixation, infection, leg length discrepancy, dislocation and neurovascular injury. The patient is also understands that anesthesia and their comorbidities present additional risks. Proper expectations were addressed as this surgery may only partially or even fail to relieve their pain. The patient understands that additional surgery may be needed during the perioperative period or in the future. We discussed the durability of prosthetic hips and limitations related to wear, osteolysis and loosening. All questions were answered the patient's satisfaction. The patient was given my packet of additional information about the procedure.\par \par Expect 1-2 day stay in hospital as this is less than standard across the country.  Although outpatient surgery may be feasible in carefully selected heathy patients - the definition of a "healthy" patient to do this in has not been properly studied in randomized trials.  This hospital time is important to observe for urinary retention, neurologic decline, cardiopulmonary issues, and signs of blood clot which are frequent early complications of joint replacements.  This time will also be used to work with PT so they are safe to navigate without falling which could lead to fracture and more surgery. \par \par There is no evidence for any of the following contraindications for total joint replacement surgery:  active infection; active systemic bacteremia; active skin infection or open wound at surgical site; neuropathic arthritis; severe, rapidly progressive neurologic disease; severe medical conditions that makes the risks of surgery outweigh the potential benefit.\par \par The hip/groin pain is effecting the ability to perform activities of daily living despite activity modifications.  The painful hip exam and the radiographic findings of cartilage loss are consistent with the hip OA as the source of the disability and pain. Patient has failed conservative treatment.\par \par Surgery will be scheduled at a convenient time.  We explicitly discussed his increased BMI puts him at increased risk of infection as does his history of smoking.  We discussed he must quit smoking at least 1 month prior and 1 month after surgery to help with wound healing.  Sooner he quits the better.  We discussed there will be a nicotine lab test he will have to do prior to surgery.\par \par Preop dental, medical clearance. \par CT scan for preop planning with Arjun\par standing sacral slope 40.1 degrees, sitting sacral slope 5.2 degrees\par Postop DVT ppx: Per Caprini Score\par Abx ppx: Extended oral antibiotics for increased BMI, history of smoking\par Dressing: Eduardo

## 2023-07-01 ENCOUNTER — APPOINTMENT (OUTPATIENT)
Dept: CT IMAGING | Facility: CLINIC | Age: 43
End: 2023-07-01
Payer: MEDICAID

## 2023-07-01 ENCOUNTER — OUTPATIENT (OUTPATIENT)
Dept: OUTPATIENT SERVICES | Facility: HOSPITAL | Age: 43
LOS: 1 days | End: 2023-07-01
Payer: MEDICAID

## 2023-07-01 DIAGNOSIS — Z90.49 ACQUIRED ABSENCE OF OTHER SPECIFIED PARTS OF DIGESTIVE TRACT: Chronic | ICD-10-CM

## 2023-07-01 DIAGNOSIS — M16.12 UNILATERAL PRIMARY OSTEOARTHRITIS, LEFT HIP: ICD-10-CM

## 2023-07-01 PROCEDURE — 73700 CT LOWER EXTREMITY W/O DYE: CPT | Mod: 26,LT

## 2023-07-01 PROCEDURE — 73700 CT LOWER EXTREMITY W/O DYE: CPT

## 2023-07-06 ENCOUNTER — RX RENEWAL (OUTPATIENT)
Age: 43
End: 2023-07-06

## 2023-08-16 ENCOUNTER — APPOINTMENT (OUTPATIENT)
Dept: INTERNAL MEDICINE | Facility: CLINIC | Age: 43
End: 2023-08-16
Payer: MEDICAID

## 2023-08-16 ENCOUNTER — RESULT REVIEW (OUTPATIENT)
Age: 43
End: 2023-08-16

## 2023-08-16 ENCOUNTER — OUTPATIENT (OUTPATIENT)
Dept: OUTPATIENT SERVICES | Facility: HOSPITAL | Age: 43
LOS: 1 days | End: 2023-08-16
Payer: MEDICAID

## 2023-08-16 VITALS
RESPIRATION RATE: 16 BRPM | HEART RATE: 61 BPM | HEIGHT: 69 IN | DIASTOLIC BLOOD PRESSURE: 89 MMHG | SYSTOLIC BLOOD PRESSURE: 126 MMHG | TEMPERATURE: 98 F | OXYGEN SATURATION: 99 % | WEIGHT: 248.02 LBS

## 2023-08-16 VITALS — SYSTOLIC BLOOD PRESSURE: 130 MMHG | DIASTOLIC BLOOD PRESSURE: 88 MMHG

## 2023-08-16 VITALS
HEIGHT: 69 IN | TEMPERATURE: 98.4 F | WEIGHT: 249 LBS | HEART RATE: 68 BPM | DIASTOLIC BLOOD PRESSURE: 92 MMHG | OXYGEN SATURATION: 98 % | SYSTOLIC BLOOD PRESSURE: 126 MMHG | BODY MASS INDEX: 36.88 KG/M2

## 2023-08-16 DIAGNOSIS — Z90.49 ACQUIRED ABSENCE OF OTHER SPECIFIED PARTS OF DIGESTIVE TRACT: Chronic | ICD-10-CM

## 2023-08-16 DIAGNOSIS — M16.12 UNILATERAL PRIMARY OSTEOARTHRITIS, LEFT HIP: ICD-10-CM

## 2023-08-16 DIAGNOSIS — Z01.818 ENCOUNTER FOR OTHER PREPROCEDURAL EXAMINATION: ICD-10-CM

## 2023-08-16 DIAGNOSIS — Z90.3 ACQUIRED ABSENCE OF STOMACH [PART OF]: Chronic | ICD-10-CM

## 2023-08-16 LAB
A1C WITH ESTIMATED AVERAGE GLUCOSE RESULT: 4.4 % — SIGNIFICANT CHANGE UP (ref 4–5.6)
ALBUMIN SERPL ELPH-MCNC: 3.6 G/DL — SIGNIFICANT CHANGE UP (ref 3.3–5)
ALP SERPL-CCNC: 71 U/L — SIGNIFICANT CHANGE UP (ref 40–120)
ALT FLD-CCNC: 43 U/L — SIGNIFICANT CHANGE UP (ref 12–78)
APTT BLD: 33 SEC — SIGNIFICANT CHANGE UP (ref 24.5–35.6)
AST SERPL-CCNC: 18 U/L — SIGNIFICANT CHANGE UP (ref 15–37)
BASOPHILS # BLD AUTO: 0.03 K/UL — SIGNIFICANT CHANGE UP (ref 0–0.2)
BASOPHILS NFR BLD AUTO: 0.6 % — SIGNIFICANT CHANGE UP (ref 0–2)
BILIRUB SERPL-MCNC: 1.3 MG/DL — HIGH (ref 0.2–1.2)
BLD GP AB SCN SERPL QL: SIGNIFICANT CHANGE UP
BUN SERPL-MCNC: 21 MG/DL — SIGNIFICANT CHANGE UP (ref 7–23)
CALCIUM SERPL-MCNC: 9.1 MG/DL — SIGNIFICANT CHANGE UP (ref 8.5–10.1)
CHLORIDE SERPL-SCNC: 111 MMOL/L — HIGH (ref 96–108)
CO2 SERPL-SCNC: 33 MMOL/L — HIGH (ref 22–31)
CREAT SERPL-MCNC: 0.78 MG/DL — SIGNIFICANT CHANGE UP (ref 0.5–1.3)
EGFR: 113 ML/MIN/1.73M2 — SIGNIFICANT CHANGE UP
EOSINOPHIL # BLD AUTO: 0.05 K/UL — SIGNIFICANT CHANGE UP (ref 0–0.5)
EOSINOPHIL NFR BLD AUTO: 0.9 % — SIGNIFICANT CHANGE UP (ref 0–6)
ESTIMATED AVERAGE GLUCOSE: 80 MG/DL — SIGNIFICANT CHANGE UP (ref 68–114)
GLUCOSE SERPL-MCNC: 59 MG/DL — LOW (ref 70–99)
HCT VFR BLD CALC: 42.9 % — SIGNIFICANT CHANGE UP (ref 39–50)
HGB BLD-MCNC: 14.8 G/DL — SIGNIFICANT CHANGE UP (ref 13–17)
IMM GRANULOCYTES NFR BLD AUTO: 0.2 % — SIGNIFICANT CHANGE UP (ref 0–0.9)
INR BLD: 0.93 RATIO — SIGNIFICANT CHANGE UP (ref 0.85–1.18)
LYMPHOCYTES # BLD AUTO: 1.89 K/UL — SIGNIFICANT CHANGE UP (ref 1–3.3)
LYMPHOCYTES # BLD AUTO: 35.8 % — SIGNIFICANT CHANGE UP (ref 13–44)
MCHC RBC-ENTMCNC: 32 PG — SIGNIFICANT CHANGE UP (ref 27–34)
MCHC RBC-ENTMCNC: 34.5 GM/DL — SIGNIFICANT CHANGE UP (ref 32–36)
MCV RBC AUTO: 92.9 FL — SIGNIFICANT CHANGE UP (ref 80–100)
MONOCYTES # BLD AUTO: 0.37 K/UL — SIGNIFICANT CHANGE UP (ref 0–0.9)
MONOCYTES NFR BLD AUTO: 7 % — SIGNIFICANT CHANGE UP (ref 2–14)
MRSA PCR RESULT.: SIGNIFICANT CHANGE UP
NEUTROPHILS # BLD AUTO: 2.93 K/UL — SIGNIFICANT CHANGE UP (ref 1.8–7.4)
NEUTROPHILS NFR BLD AUTO: 55.5 % — SIGNIFICANT CHANGE UP (ref 43–77)
PLATELET # BLD AUTO: 219 K/UL — SIGNIFICANT CHANGE UP (ref 150–400)
POTASSIUM SERPL-MCNC: 3.8 MMOL/L — SIGNIFICANT CHANGE UP (ref 3.5–5.3)
POTASSIUM SERPL-SCNC: 3.8 MMOL/L — SIGNIFICANT CHANGE UP (ref 3.5–5.3)
PROT SERPL-MCNC: 7.5 GM/DL — SIGNIFICANT CHANGE UP (ref 6–8.3)
PROTHROM AB SERPL-ACNC: 10.5 SEC — SIGNIFICANT CHANGE UP (ref 9.5–13)
RBC # BLD: 4.62 M/UL — SIGNIFICANT CHANGE UP (ref 4.2–5.8)
RBC # FLD: 11.9 % — SIGNIFICANT CHANGE UP (ref 10.3–14.5)
S AUREUS DNA NOSE QL NAA+PROBE: SIGNIFICANT CHANGE UP
SODIUM SERPL-SCNC: 144 MMOL/L — SIGNIFICANT CHANGE UP (ref 135–145)
WBC # BLD: 5.28 K/UL — SIGNIFICANT CHANGE UP (ref 3.8–10.5)
WBC # FLD AUTO: 5.28 K/UL — SIGNIFICANT CHANGE UP (ref 3.8–10.5)

## 2023-08-16 PROCEDURE — 86901 BLOOD TYPING SEROLOGIC RH(D): CPT

## 2023-08-16 PROCEDURE — 71046 X-RAY EXAM CHEST 2 VIEWS: CPT | Mod: 26

## 2023-08-16 PROCEDURE — 93010 ELECTROCARDIOGRAM REPORT: CPT

## 2023-08-16 PROCEDURE — 93005 ELECTROCARDIOGRAM TRACING: CPT

## 2023-08-16 PROCEDURE — 71046 X-RAY EXAM CHEST 2 VIEWS: CPT

## 2023-08-16 PROCEDURE — 87641 MR-STAPH DNA AMP PROBE: CPT

## 2023-08-16 PROCEDURE — 85610 PROTHROMBIN TIME: CPT

## 2023-08-16 PROCEDURE — 86850 RBC ANTIBODY SCREEN: CPT

## 2023-08-16 PROCEDURE — 80053 COMPREHEN METABOLIC PANEL: CPT

## 2023-08-16 PROCEDURE — 80323 ALKALOIDS NOS: CPT

## 2023-08-16 PROCEDURE — 36415 COLL VENOUS BLD VENIPUNCTURE: CPT

## 2023-08-16 PROCEDURE — 85025 COMPLETE CBC W/AUTO DIFF WBC: CPT

## 2023-08-16 PROCEDURE — 99214 OFFICE O/P EST MOD 30 MIN: CPT

## 2023-08-16 PROCEDURE — 87640 STAPH A DNA AMP PROBE: CPT

## 2023-08-16 PROCEDURE — 86900 BLOOD TYPING SEROLOGIC ABO: CPT

## 2023-08-16 PROCEDURE — 83036 HEMOGLOBIN GLYCOSYLATED A1C: CPT

## 2023-08-16 PROCEDURE — 99214 OFFICE O/P EST MOD 30 MIN: CPT | Mod: 25

## 2023-08-16 PROCEDURE — 85730 THROMBOPLASTIN TIME PARTIAL: CPT

## 2023-08-16 RX ORDER — OXYCODONE AND ACETAMINOPHEN 5; 325 MG/1; MG/1
1 TABLET ORAL
Qty: 0 | Refills: 0 | DISCHARGE

## 2023-08-16 RX ORDER — OMEPRAZOLE 10 MG/1
1 CAPSULE, DELAYED RELEASE ORAL
Qty: 0 | Refills: 0 | DISCHARGE

## 2023-08-16 RX ORDER — ASPIRIN/CALCIUM CARB/MAGNESIUM 324 MG
1 TABLET ORAL
Qty: 0 | Refills: 0 | DISCHARGE

## 2023-08-16 RX ORDER — ONDANSETRON 8 MG/1
1 TABLET, FILM COATED ORAL
Qty: 0 | Refills: 0 | DISCHARGE

## 2023-08-16 NOTE — H&P PST ADULT - NSICDXPASTMEDICALHX_GEN_ALL_CORE_FT
PAST MEDICAL HISTORY:  Fatty liver     HTN (hypertension)     YISSEL on CPAP     Osteoarthritis      PAST MEDICAL HISTORY:  Fatty liver     HTN (hypertension)     Morbid obesity     YISSEL on CPAP     Osteoarthritis
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
## 2023-08-17 ENCOUNTER — NON-APPOINTMENT (OUTPATIENT)
Age: 43
End: 2023-08-17

## 2023-08-17 DIAGNOSIS — Z01.818 ENCOUNTER FOR OTHER PREPROCEDURAL EXAMINATION: ICD-10-CM

## 2023-08-17 DIAGNOSIS — M16.12 UNILATERAL PRIMARY OSTEOARTHRITIS, LEFT HIP: ICD-10-CM

## 2023-08-17 LAB — GLUCOSE BLDC GLUCOMTR-MCNC: 77

## 2023-08-17 NOTE — ASSESSMENT
[Patient Optimized for Surgery] : Patient optimized for surgery [As per surgery] : as per surgery [FreeTextEntry4] : Patient is moderate risk for intermediate risk procedure. PST results all reviewed. Repeat glucose in the office is normal at 77.  Adequate oxygen monitoring. DVT prophylaxis. Continue current medications as directed. Covid 19 nasopharyngeal swab per Surgery. Patient advised to hold aspirin, all NSAIDs including Motrin, naproxen, Aleve, ibuprofen, Advil and fish oil 7 days prior to surgery.

## 2023-08-17 NOTE — HISTORY OF PRESENT ILLNESS
[No Pertinent Pulmonary History] : no history of asthma, COPD, sleep apnea, or smoking [No Adverse Anesthesia Reaction] : no adverse anesthesia reaction in self or family member [(Patient denies any chest pain, claudication, dyspnea on exertion, orthopnea, palpitations or syncope)] : Patient denies any chest pain, claudication, dyspnea on exertion, orthopnea, palpitations or syncope [Family Member] : no family member with adverse anesthesia reaction/sudden death [Self] : no previous adverse anesthesia reaction [FreeTextEntry1] : Left Hip Replacement [FreeTextEntry2] : 8/23/23 [FreeTextEntry3] : Dr.Michael Cervantes  [FreeTextEntry4] : Mr. MARY LOU CARRION is a 43 year M who comes in for a preoperative evaluation. Patient with history of hypertension.  Patient with increasingly severe left hip pain causing him to limp.  Recent CT scan left hip shows severe left arthrosis of the hip.  Patient is now scheduled for left hip replacement.  Patient had presurgical testing earlier this morning. Patient denies any cp, sob, abdominal pain, nausea, vomiting, palpitations, fever, chills, constipation, diarrhea.  Anesthesia History: Mr. MARY LOU CARRION has had no adverse effects to anesthesia in the past.   Functional Capacity-Walking 1-2 blocks or climbing stairs symptoms: Mr. MARY LOU CARRION denies any symptoms of chest pain, FRY, SOB or palpitations.

## 2023-08-17 NOTE — RESULTS/DATA
[] : results reviewed [de-identified] : stable [de-identified] : stable [de-identified] : glucose 59, repeat poct glucose in office 77. total bili mildly high at 1.3.  [de-identified] : negative [de-identified] : EKG: NSR at 68 bpm, no ST-T changes, unchanged from previous ECG. [de-identified] : MRSA swab: negative.

## 2023-08-18 ENCOUNTER — NON-APPOINTMENT (OUTPATIENT)
Age: 43
End: 2023-08-18

## 2023-08-19 LAB
COTINE: <1 NG/ML — SIGNIFICANT CHANGE UP
COTININE SERPL-MCNC: <1 NG/ML — SIGNIFICANT CHANGE UP

## 2023-08-21 RX ORDER — TRANEXAMIC ACID 100 MG/ML
1000 INJECTION, SOLUTION INTRAVENOUS ONCE
Refills: 0 | Status: DISCONTINUED | OUTPATIENT
Start: 2023-08-23 | End: 2023-08-24

## 2023-08-22 RX ORDER — ONDANSETRON 8 MG/1
8 TABLET, FILM COATED ORAL EVERY 8 HOURS
Refills: 0 | Status: DISCONTINUED | OUTPATIENT
Start: 2023-08-23 | End: 2023-08-24

## 2023-08-22 RX ORDER — FOLIC ACID 0.8 MG
1 TABLET ORAL DAILY
Refills: 0 | Status: DISCONTINUED | OUTPATIENT
Start: 2023-08-23 | End: 2023-08-24

## 2023-08-22 RX ORDER — CELECOXIB 200 MG/1
200 CAPSULE ORAL EVERY 12 HOURS
Refills: 0 | Status: DISCONTINUED | OUTPATIENT
Start: 2023-08-23 | End: 2023-08-24

## 2023-08-22 RX ORDER — POLYETHYLENE GLYCOL 3350 17 G/17G
17 POWDER, FOR SOLUTION ORAL AT BEDTIME
Refills: 0 | Status: DISCONTINUED | OUTPATIENT
Start: 2023-08-23 | End: 2023-08-24

## 2023-08-22 RX ORDER — HYDROMORPHONE HYDROCHLORIDE 2 MG/ML
0.5 INJECTION INTRAMUSCULAR; INTRAVENOUS; SUBCUTANEOUS EVERY 4 HOURS
Refills: 0 | Status: COMPLETED | OUTPATIENT
Start: 2023-08-23 | End: 2023-08-30

## 2023-08-22 RX ORDER — ACETAMINOPHEN 500 MG
1000 TABLET ORAL EVERY 8 HOURS
Refills: 0 | Status: DISCONTINUED | OUTPATIENT
Start: 2023-08-23 | End: 2023-08-24

## 2023-08-22 RX ORDER — FERROUS SULFATE 325(65) MG
325 TABLET ORAL DAILY
Refills: 0 | Status: DISCONTINUED | OUTPATIENT
Start: 2023-08-23 | End: 2023-08-24

## 2023-08-22 RX ORDER — ASPIRIN/CALCIUM CARB/MAGNESIUM 324 MG
81 TABLET ORAL
Refills: 0 | Status: DISCONTINUED | OUTPATIENT
Start: 2023-08-23 | End: 2023-08-24

## 2023-08-22 RX ORDER — OXYCODONE HYDROCHLORIDE 5 MG/1
10 TABLET ORAL EVERY 4 HOURS
Refills: 0 | Status: DISCONTINUED | OUTPATIENT
Start: 2023-08-23 | End: 2023-08-24

## 2023-08-22 RX ORDER — SODIUM CHLORIDE 9 MG/ML
1000 INJECTION, SOLUTION INTRAVENOUS
Refills: 0 | Status: DISCONTINUED | OUTPATIENT
Start: 2023-08-23 | End: 2023-08-24

## 2023-08-22 RX ORDER — OXYCODONE HYDROCHLORIDE 5 MG/1
5 TABLET ORAL EVERY 4 HOURS
Refills: 0 | Status: DISCONTINUED | OUTPATIENT
Start: 2023-08-23 | End: 2023-08-24

## 2023-08-22 RX ORDER — SENNA PLUS 8.6 MG/1
2 TABLET ORAL AT BEDTIME
Refills: 0 | Status: DISCONTINUED | OUTPATIENT
Start: 2023-08-23 | End: 2023-08-24

## 2023-08-22 RX ORDER — ASCORBIC ACID 60 MG
500 TABLET,CHEWABLE ORAL
Refills: 0 | Status: DISCONTINUED | OUTPATIENT
Start: 2023-08-23 | End: 2023-08-24

## 2023-08-22 RX ORDER — PANTOPRAZOLE SODIUM 20 MG/1
40 TABLET, DELAYED RELEASE ORAL
Refills: 0 | Status: DISCONTINUED | OUTPATIENT
Start: 2023-08-23 | End: 2023-08-24

## 2023-08-22 RX ORDER — PROCHLORPERAZINE MALEATE 5 MG
10 TABLET ORAL EVERY 8 HOURS
Refills: 0 | Status: DISCONTINUED | OUTPATIENT
Start: 2023-08-23 | End: 2023-08-24

## 2023-08-23 ENCOUNTER — TRANSCRIPTION ENCOUNTER (OUTPATIENT)
Age: 43
End: 2023-08-23

## 2023-08-23 ENCOUNTER — APPOINTMENT (OUTPATIENT)
Dept: ORTHOPEDIC SURGERY | Facility: HOSPITAL | Age: 43
End: 2023-08-23

## 2023-08-23 ENCOUNTER — RESULT REVIEW (OUTPATIENT)
Age: 43
End: 2023-08-23

## 2023-08-23 ENCOUNTER — INPATIENT (INPATIENT)
Facility: HOSPITAL | Age: 43
LOS: 0 days | Discharge: HOME HEALTH | DRG: 301 | End: 2023-08-24
Attending: STUDENT IN AN ORGANIZED HEALTH CARE EDUCATION/TRAINING PROGRAM | Admitting: STUDENT IN AN ORGANIZED HEALTH CARE EDUCATION/TRAINING PROGRAM
Payer: MEDICAID

## 2023-08-23 VITALS
HEIGHT: 69 IN | HEART RATE: 53 BPM | DIASTOLIC BLOOD PRESSURE: 97 MMHG | TEMPERATURE: 97 F | OXYGEN SATURATION: 100 % | WEIGHT: 248.02 LBS | RESPIRATION RATE: 16 BRPM | SYSTOLIC BLOOD PRESSURE: 149 MMHG

## 2023-08-23 DIAGNOSIS — Z90.3 ACQUIRED ABSENCE OF STOMACH [PART OF]: Chronic | ICD-10-CM

## 2023-08-23 DIAGNOSIS — M16.12 UNILATERAL PRIMARY OSTEOARTHRITIS, LEFT HIP: ICD-10-CM

## 2023-08-23 DIAGNOSIS — Z90.49 ACQUIRED ABSENCE OF OTHER SPECIFIED PARTS OF DIGESTIVE TRACT: Chronic | ICD-10-CM

## 2023-08-23 LAB
ANION GAP SERPL CALC-SCNC: 3 MMOL/L — LOW (ref 5–17)
BUN SERPL-MCNC: 14 MG/DL — SIGNIFICANT CHANGE UP (ref 7–23)
CALCIUM SERPL-MCNC: 8.4 MG/DL — LOW (ref 8.5–10.1)
CHLORIDE SERPL-SCNC: 109 MMOL/L — HIGH (ref 96–108)
CO2 SERPL-SCNC: 26 MMOL/L — SIGNIFICANT CHANGE UP (ref 22–31)
CREAT SERPL-MCNC: 0.66 MG/DL — SIGNIFICANT CHANGE UP (ref 0.5–1.3)
EGFR: 119 ML/MIN/1.73M2 — SIGNIFICANT CHANGE UP
GLUCOSE BLDC GLUCOMTR-MCNC: 145 MG/DL — HIGH (ref 70–99)
GLUCOSE BLDC GLUCOMTR-MCNC: 85 MG/DL — SIGNIFICANT CHANGE UP (ref 70–99)
GLUCOSE SERPL-MCNC: 157 MG/DL — HIGH (ref 70–99)
HCT VFR BLD CALC: 39.3 % — SIGNIFICANT CHANGE UP (ref 39–50)
HGB BLD-MCNC: 13.4 G/DL — SIGNIFICANT CHANGE UP (ref 13–17)
MCHC RBC-ENTMCNC: 32.1 PG — SIGNIFICANT CHANGE UP (ref 27–34)
MCHC RBC-ENTMCNC: 34.1 GM/DL — SIGNIFICANT CHANGE UP (ref 32–36)
MCV RBC AUTO: 94.2 FL — SIGNIFICANT CHANGE UP (ref 80–100)
PLATELET # BLD AUTO: 222 K/UL — SIGNIFICANT CHANGE UP (ref 150–400)
POTASSIUM SERPL-MCNC: 3.5 MMOL/L — SIGNIFICANT CHANGE UP (ref 3.5–5.3)
POTASSIUM SERPL-SCNC: 3.5 MMOL/L — SIGNIFICANT CHANGE UP (ref 3.5–5.3)
RBC # BLD: 4.17 M/UL — LOW (ref 4.2–5.8)
RBC # FLD: 12 % — SIGNIFICANT CHANGE UP (ref 10.3–14.5)
SODIUM SERPL-SCNC: 138 MMOL/L — SIGNIFICANT CHANGE UP (ref 135–145)
WBC # BLD: 15.14 K/UL — HIGH (ref 3.8–10.5)
WBC # FLD AUTO: 15.14 K/UL — HIGH (ref 3.8–10.5)

## 2023-08-23 PROCEDURE — 85027 COMPLETE CBC AUTOMATED: CPT

## 2023-08-23 PROCEDURE — C9399: CPT

## 2023-08-23 PROCEDURE — 97530 THERAPEUTIC ACTIVITIES: CPT | Mod: GP

## 2023-08-23 PROCEDURE — 97161 PT EVAL LOW COMPLEX 20 MIN: CPT | Mod: GP

## 2023-08-23 PROCEDURE — 82962 GLUCOSE BLOOD TEST: CPT

## 2023-08-23 PROCEDURE — 97607 NEG PRS WND THR NDME<=50SQCM: CPT

## 2023-08-23 PROCEDURE — 73501 X-RAY EXAM HIP UNI 1 VIEW: CPT | Mod: LT

## 2023-08-23 PROCEDURE — 88305 TISSUE EXAM BY PATHOLOGIST: CPT

## 2023-08-23 PROCEDURE — 97535 SELF CARE MNGMENT TRAINING: CPT | Mod: GO

## 2023-08-23 PROCEDURE — 36415 COLL VENOUS BLD VENIPUNCTURE: CPT

## 2023-08-23 PROCEDURE — 97116 GAIT TRAINING THERAPY: CPT | Mod: GP

## 2023-08-23 PROCEDURE — C1713: CPT

## 2023-08-23 PROCEDURE — 88305 TISSUE EXAM BY PATHOLOGIST: CPT | Mod: 26

## 2023-08-23 PROCEDURE — 80048 BASIC METABOLIC PNL TOTAL CA: CPT

## 2023-08-23 PROCEDURE — 73501 X-RAY EXAM HIP UNI 1 VIEW: CPT | Mod: 26,LT

## 2023-08-23 PROCEDURE — 27130 TOTAL HIP ARTHROPLASTY: CPT | Mod: LT

## 2023-08-23 PROCEDURE — 97166 OT EVAL MOD COMPLEX 45 MIN: CPT | Mod: GO

## 2023-08-23 PROCEDURE — C1776: CPT

## 2023-08-23 PROCEDURE — 20985 CPTR-ASST DIR MS PX: CPT

## 2023-08-23 RX ORDER — OXYCODONE HYDROCHLORIDE 5 MG/1
1 TABLET ORAL
Qty: 30 | Refills: 0
Start: 2023-08-23 | End: 2023-08-27

## 2023-08-23 RX ORDER — PANTOPRAZOLE SODIUM 20 MG/1
1 TABLET, DELAYED RELEASE ORAL
Qty: 30 | Refills: 0
Start: 2023-08-23 | End: 2023-09-21

## 2023-08-23 RX ORDER — CEPHALEXIN 500 MG
250 CAPSULE ORAL EVERY 6 HOURS
Refills: 0 | Status: DISCONTINUED | OUTPATIENT
Start: 2023-08-24 | End: 2023-08-24

## 2023-08-23 RX ORDER — ASPIRIN/CALCIUM CARB/MAGNESIUM 324 MG
1 TABLET ORAL
Qty: 56 | Refills: 0
Start: 2023-08-23 | End: 2023-09-19

## 2023-08-23 RX ORDER — OXYCODONE HYDROCHLORIDE 5 MG/1
5 TABLET ORAL ONCE
Refills: 0 | Status: DISCONTINUED | OUTPATIENT
Start: 2023-08-23 | End: 2023-08-23

## 2023-08-23 RX ORDER — ACETAMINOPHEN 500 MG
2 TABLET ORAL
Qty: 84 | Refills: 0
Start: 2023-08-23 | End: 2023-09-05

## 2023-08-23 RX ORDER — LISINOPRIL 2.5 MG/1
40 TABLET ORAL DAILY
Refills: 0 | Status: DISCONTINUED | OUTPATIENT
Start: 2023-08-23 | End: 2023-08-24

## 2023-08-23 RX ORDER — MELOXICAM 15 MG/1
1 TABLET ORAL
Refills: 0 | DISCHARGE

## 2023-08-23 RX ORDER — POLYETHYLENE GLYCOL 3350 17 G/17G
17 POWDER, FOR SOLUTION ORAL
Qty: 1 | Refills: 0
Start: 2023-08-23 | End: 2023-09-05

## 2023-08-23 RX ORDER — HYDROMORPHONE HYDROCHLORIDE 2 MG/ML
0.5 INJECTION INTRAMUSCULAR; INTRAVENOUS; SUBCUTANEOUS EVERY 4 HOURS
Refills: 0 | Status: DISCONTINUED | OUTPATIENT
Start: 2023-08-23 | End: 2023-08-24

## 2023-08-23 RX ORDER — HYDROCHLOROTHIAZIDE 25 MG
1 TABLET ORAL
Refills: 0 | DISCHARGE

## 2023-08-23 RX ORDER — DEXAMETHASONE 0.5 MG/5ML
8 ELIXIR ORAL ONCE
Refills: 0 | Status: COMPLETED | OUTPATIENT
Start: 2023-08-24 | End: 2023-08-24

## 2023-08-23 RX ORDER — ONDANSETRON 8 MG/1
1 TABLET, FILM COATED ORAL
Qty: 15 | Refills: 0
Start: 2023-08-23 | End: 2023-08-27

## 2023-08-23 RX ORDER — FENTANYL CITRATE 50 UG/ML
50 INJECTION INTRAVENOUS
Refills: 0 | Status: DISCONTINUED | OUTPATIENT
Start: 2023-08-23 | End: 2023-08-23

## 2023-08-23 RX ORDER — ACETAMINOPHEN 500 MG
1 TABLET ORAL
Refills: 0 | DISCHARGE

## 2023-08-23 RX ORDER — OXYCODONE HYDROCHLORIDE 5 MG/1
10 TABLET ORAL ONCE
Refills: 0 | Status: DISCONTINUED | OUTPATIENT
Start: 2023-08-23 | End: 2023-08-23

## 2023-08-23 RX ORDER — CEFAZOLIN SODIUM 1 G
2000 VIAL (EA) INJECTION EVERY 8 HOURS
Refills: 0 | Status: COMPLETED | OUTPATIENT
Start: 2023-08-23 | End: 2023-08-24

## 2023-08-23 RX ORDER — LISINOPRIL 2.5 MG/1
1 TABLET ORAL
Refills: 0 | DISCHARGE

## 2023-08-23 RX ORDER — SENNA PLUS 8.6 MG/1
2 TABLET ORAL
Qty: 28 | Refills: 0
Start: 2023-08-23 | End: 2023-09-05

## 2023-08-23 RX ORDER — PANTOPRAZOLE SODIUM 20 MG/1
40 TABLET, DELAYED RELEASE ORAL ONCE
Refills: 0 | Status: COMPLETED | OUTPATIENT
Start: 2023-08-23 | End: 2023-08-23

## 2023-08-23 RX ORDER — CELECOXIB 200 MG/1
1 CAPSULE ORAL
Qty: 60 | Refills: 0
Start: 2023-08-23 | End: 2023-09-21

## 2023-08-23 RX ORDER — SODIUM CHLORIDE 9 MG/ML
1000 INJECTION, SOLUTION INTRAVENOUS
Refills: 0 | Status: DISCONTINUED | OUTPATIENT
Start: 2023-08-23 | End: 2023-08-23

## 2023-08-23 RX ADMIN — ONDANSETRON 8 MILLIGRAM(S): 8 TABLET, FILM COATED ORAL at 21:20

## 2023-08-23 RX ADMIN — OXYCODONE HYDROCHLORIDE 10 MILLIGRAM(S): 5 TABLET ORAL at 17:33

## 2023-08-23 RX ADMIN — SODIUM CHLORIDE 75 MILLILITER(S): 9 INJECTION, SOLUTION INTRAVENOUS at 17:31

## 2023-08-23 RX ADMIN — HYDROMORPHONE HYDROCHLORIDE 0.5 MILLIGRAM(S): 2 INJECTION INTRAMUSCULAR; INTRAVENOUS; SUBCUTANEOUS at 18:51

## 2023-08-23 RX ADMIN — CELECOXIB 200 MILLIGRAM(S): 200 CAPSULE ORAL at 21:21

## 2023-08-23 RX ADMIN — Medication 100 MILLIGRAM(S): at 21:21

## 2023-08-23 RX ADMIN — OXYCODONE HYDROCHLORIDE 10 MILLIGRAM(S): 5 TABLET ORAL at 18:24

## 2023-08-23 RX ADMIN — HYDROMORPHONE HYDROCHLORIDE 0.5 MILLIGRAM(S): 2 INJECTION INTRAMUSCULAR; INTRAVENOUS; SUBCUTANEOUS at 19:20

## 2023-08-23 RX ADMIN — Medication 500 MILLIGRAM(S): at 21:21

## 2023-08-23 RX ADMIN — Medication 81 MILLIGRAM(S): at 21:23

## 2023-08-23 RX ADMIN — Medication 1000 MILLIGRAM(S): at 21:20

## 2023-08-23 RX ADMIN — PANTOPRAZOLE SODIUM 40 MILLIGRAM(S): 20 TABLET, DELAYED RELEASE ORAL at 10:20

## 2023-08-23 NOTE — INPATIENT CERTIFICATION FOR MEDICARE PATIENTS - PHYSICIAN CONCUR
I concur with the Admission Order and I certify that services are provided in accordance with Section 42 CFR § 412.3 Bill For Surgical Tray: no Consent: Verbal consent was obtained and risks were reviewed including but not limited to scarring, infection, bleeding, scabbing, incomplete removal, nerve damage and allergy to anesthesia. Billing Type: Third-Party Bill Anesthesia Volume In Cc: 3 Anesthesia Type: 1% lidocaine with 1:200,000 epinephrine Wound Care: Polysporin ointment Hemostasis: Aluminum Chloride Electrodesiccation And Curettage Text: The wound bed was treated with electrodesiccation and curettage after the biopsy was performed. Detail Level: Simple Size Of Lesion In Cm: 0 Biopsy Type: H and E Dressing: bandage Electrodesiccation Text: The wound bed was treated with electrodesiccation after the biopsy was performed. Lab: [id number] Post-Care Instructions: I reviewed with the patient in detail post-care instructions. Patient is to keep the biopsy site dry overnight, and then apply bacitracin twice daily until healed. Patient may apply hydrogen peroxide soaks to remove any crusting. Cryotherapy Text: The wound bed was treated with cryotherapy after the biopsy was performed. Biopsy Method: Personna blade Silver Nitrate Text: The wound bed was treated with silver nitrate after the biopsy was performed. Body Location Override (Optional - Billing Will Still Be Based On Selected Body Map Location If Applicable): Midline upper back Type Of Destruction Used: Curettage Curettage Text: The wound bed was treated with cautery after the biopsy was performed. Notification Instructions: Patient will be notified of biopsy results. However, patient instructed to call the office if not contacted within 2 weeks.

## 2023-08-23 NOTE — PHYSICAL THERAPY INITIAL EVALUATION ADULT - GENERAL OBSERVATIONS, REHAB EVAL
Pt seen for 30min PT bedside Eval. Pt rec'd semi supine in bed in NAD, declining OOB 2/2 fatigue and slight lightheadedness, repots feeling groggy. Pt willing to participate in bedside eval. History taken, pt ROM WFL, and strength grossly 3+/5 throughout, LLE grossly 3-/5. Pt left semi supine in bed in NAD, all needs met, +bed alarm, RN aware.

## 2023-08-23 NOTE — DISCHARGE NOTE PROVIDER - NSDCFUADDINST_GEN_ALL_CORE_FT
Discharge Instructions for Left Total Hip Arthroplasty:    1. ACTIVITY: WBAT. Rolling walker. Posterior Hip Dislocation Precautions. Abduction Pillow while in bed for 6 weeks. Daily PT.  2. CALL FOR: fever over 101, wound redness, drainage or open area, calf pain/calf swelling.  3. BANDAGE: On POD7 (8/30/23) Home PT to place a new Mepilex Ag bandage over incision.  Be careful not to removed mesh that is glued to the wound. There are no sutures or staples to remove. May change sooner ONLY if leaks or falls off. DO NOT REMOVE BANDAGE TO CHECK WOUND ON INTAKE.  4. STAPLES: There are NO Staples to remove. Sutures are dissolvable.   5. SHOWER: Can shower. No Tub baths.  6. ANTIBIOTICS: Continue PO **** to complete a total of 7 days of antibiotics which started just before your surgery. eRx sent to the Pharmacy.   7. DVT PE Prophylaxis: Aspirin 81mg PO BID x 28 days. See Med Rec.  8. GI: Continue Protonix daily while on Anticoagulant. eRx has been sent to your pharmacy.  9. FOLLOW UP: Dr. Cervantes in 14 days. Call office to schedule.    10. MEDICATION: eRX sent to your pharmacy for  if you go home.  11. **Call office if medications not covered under your insurance , especially BLOOD CLOT PREVENTION/anticoagulant medication.   Discharge Instructions for Left Total Hip Arthroplasty:    1. ACTIVITY: WBAT. Rolling walker. Posterior Hip Dislocation Precautions. Abduction Pillow while in bed for 6 weeks. Daily PT.  2. CALL FOR: fever over 101, wound redness, drainage or open area, calf pain/calf swelling.  3. BANDAGE: On POD7 (8/30/23) Home PT to place a new Mepilex Ag bandage over incision.  Be careful not to removed mesh that is glued to the wound. There are no sutures or staples to remove. May change sooner ONLY if leaks or falls off. DO NOT REMOVE BANDAGE TO CHECK WOUND ON INTAKE.  4. STAPLES: There are NO Staples to remove. Sutures are dissolvable.   5. SHOWER: Can shower. No Tub baths.  6. ANTIBIOTICS: Continue PO Duricef twice daily to complete a total of 7 days of antibiotics which started just before your surgery. eRx sent to the Pharmacy.   7. DVT PE Prophylaxis: Aspirin 81mg PO BID x 28 days. See Med Rec.  8. GI: Continue Protonix daily while on Anticoagulant. eRx has been sent to your pharmacy.  9. FOLLOW UP: Dr. Cervantes in 14 days. Call office to schedule.    10. MEDICATION: eRX sent to your pharmacy for  if you go home.  11. **Call office if medications not covered under your insurance , especially BLOOD CLOT PREVENTION/anticoagulant medication.   Discharge Instructions for Left Total Hip Arthroplasty:    1. ACTIVITY: WBAT. Rolling walker. Posterior Hip Dislocation Precautions. Abduction Pillow while in bed for 6 weeks. Daily PT.  2. CALL FOR: fever over 101, wound redness, drainage or open area, calf pain/calf swelling.  3. BANDAGE: Remove NAVID NO SOONER than POD7 (8/30/23) and place a Mepilex Ag bandage over incision. May change sooner ONLY if NAVID leaks or falls off. DO NOT REMOVE BANDAGE TO CHECK WOUND ON INTAKE.  4. STAPLES: There are NO Staples to remove. Sutures are Dissolvable.  5. SHOWER: Okay to shower so long as battery pack is kept dry.  6. ANTIBIOTICS: Continue PO Duricef twice daily to complete a total of 7 days of antibiotics which started just before your surgery. eRx sent to the Pharmacy.   7. DVT PE Prophylaxis: Aspirin 81mg PO BID x 28 days. See Med Rec.  8. GI: Continue Protonix daily while on Anticoagulant. eRx has been sent to your pharmacy.  9. FOLLOW UP: Dr. Cervantes in 14 days. Call office to schedule.    10. MEDICATION: eRX sent to your pharmacy for  if you go home.  11. **Call office if medications not covered under your insurance , especially BLOOD CLOT PREVENTION/anticoagulant medication.   Discharge Instructions for Left Total Hip Arthroplasty:    1. ACTIVITY: WBAT. Rolling walker. Posterior Hip Dislocation Precautions. Abduction Pillow while in bed for 6 weeks. Daily PT.  2. CALL FOR: fever over 101, wound redness, drainage or open area, calf pain/calf swelling.  3. BANDAGE: Home PT to remove NAVID NO SOONER than POD7 (8/30/23) and place a Mepilex Ag bandage over incision. May change sooner ONLY if NAVID leaks or falls off. DO NOT REMOVE BANDAGE TO CHECK WOUND ON INTAKE.  4. STAPLES: There are NO Staples to remove. Sutures are Dissolvable.  5. SHOWER: Okay to shower so long as battery pack is kept dry.  6. ANTIBIOTICS: Continue PO Duricef twice daily to complete a total of 7 days of antibiotics which started just before your surgery. eRx sent to the Pharmacy.   7. DVT PE Prophylaxis: Aspirin 81mg PO BID x 28 days. See Med Rec.  8. GI: Continue Protonix daily while on Anticoagulant. eRx has been sent to your pharmacy.  9. FOLLOW UP: Dr. Cervantes in 14 days. Call office to schedule.    10. MEDICATION: eRX sent to your pharmacy for  if you go home.  11. **Call office if medications not covered under your insurance , especially BLOOD CLOT PREVENTION/anticoagulant medication.

## 2023-08-23 NOTE — PROGRESS NOTE ADULT - SUBJECTIVE AND OBJECTIVE BOX
Patient seen and examined at bedside. Pain is controlled. Patient denies any numbness, tingling, weakness, or any other orthopaedic complaint.    LABS:                        13.4   15.14 )-----------( 222      ( 23 Aug 2023 16:15 )             39.3     08-23    138  |  109<H>  |  14  ----------------------------<  157<H>  3.5   |  26  |  0.66    Ca    8.4<L>      23 Aug 2023 16:15        Urinalysis Basic - ( 23 Aug 2023 16:15 )    Color: x / Appearance: x / SG: x / pH: x  Gluc: 157 mg/dL / Ketone: x  / Bili: x / Urobili: x   Blood: x / Protein: x / Nitrite: x   Leuk Esterase: x / RBC: x / WBC x   Sq Epi: x / Non Sq Epi: x / Bacteria: x        VITAL SIGNS:  T(C): 36.7 (08-23-23 @ 17:00), Max: 36.7 (08-23-23 @ 17:00)  HR: 71 (08-23-23 @ 17:30) (53 - 93)  BP: 135/84 (08-23-23 @ 17:30) (133/83 - 156/100)  RR: 12 (08-23-23 @ 17:30) (12 - 20)  SpO2: 100% (08-23-23 @ 17:30) (99% - 100%)    Gen: Resting in bed, no acute distress  LLE  NAVID Dressing in place, clean/dry/intact.   Ping-incisional tenderness to palpation; otherwise, NTTP throughout the rest of the extremity.   SILT L2-S1.  GSC/TA/EHL/FHL intact. Q/H unable to assess 2' pain.   DP pulse palpable.   No calf tenderness bilaterally.   Compartments soft and compressible.             Assessment and Plan:  43y Male POD0 L KAYLAN    Follow up postoperative labs  WBAT/PT/OT Posterior precautions, pink pillow  Pain management PRN  Continue prophylactic antibiotics  DVT PPx: hold until POD1, A81  Incentive spirometry  Dispo: pending recommendations per PT  Will discuss with Dr. Cervantes and advise of any changes to the plan.

## 2023-08-23 NOTE — DISCHARGE NOTE PROVIDER - CARE PROVIDER_API CALL
Saul Cervantes  Orthopaedic Surgery  [address] [place] [id number]  Phone: [phone]  Fax: [phone]  Follow Up Time:

## 2023-08-23 NOTE — DISCHARGE NOTE PROVIDER - HOSPITAL COURSE
H&P:  Pt is a 43y Male    PAST MEDICAL & SURGICAL HISTORY:  HTN (hypertension)      Fatty liver      YISSEL on CPAP      Osteoarthritis      Morbid obesity      S/P cholecystectomy  2014      History of sleeve gastrectomy           Now s/p Left Total Hip Arthroplasty. Pt is afebrile with stable vital signs. Pain is controlled. Alert and Oriented. Exam reveals intact EHL FHL TA GS, +DP. Dressing is clean and dry.    Hospital Course:  Patient presented to [facility] medically cleared for elective Left Total Hip Replacement Surgery, having failed outpatient conservative management. Prophylactic IV antibiotics were started before the procedure and continued for 24 hours post-op and transitioned to PO antibiotics to complete, in total, a 7 day course. They were admitted after surgery to the orthopedic floor. There were no complications during the hospital stay. All home medications were continued.     **Pt received **U PRBC post op for Acute Blood Loss Anemia.    Routine consults were obtained from Physical Therapy for twice daily PT and from the Hospitalist for Medical Co-management. Patient was placed on anticoagulation. Pertinent home medications were continued. Daily labs were followed.      POD 0 pt was stable overnight. No major events post-op. Pt received PT twice daily. The plan is for DC to home with home PT** or to Rehab for ongoing PT**. The orthopedic Attending is aware and agrees.      **POD1 DC DOCUMENTATION** (Keep or Delete depending on scenario)  The patient had no post-operative complications and clinically progressed faster than anticipated. The following condition(s) were actively treated during the hospital stay:  HTN  BMI>=35  Obstructive Sleep Apnea  The patient met criteria for discharge before the 2nd night of the stay. The patient was appropriately and safely discharged home with home PT.    ******INCOMPLETE NOTE IN PREPARATION FOR DISPO PLANNING*******  ******INCOMPLETE NOTE IN PREPARATION FOR DISPO PLANNING*******  ******INCOMPLETE NOTE IN PREPARATION FOR DISPO PLANNING******* H&P:  Pt is a 43y Male    PAST MEDICAL & SURGICAL HISTORY:  HTN (hypertension)      Fatty liver      YISSEL on CPAP      Osteoarthritis      Morbid obesity      S/P cholecystectomy  2014      History of sleeve gastrectomy           Now s/p Left Total Hip Arthroplasty. Pt is afebrile with stable vital signs. Pain is controlled. Alert and Oriented. Exam reveals intact EHL FHL TA GS, +DP. Dressing is clean and dry.    Hospital Course:  Patient presented to [facility] medically cleared for elective Left Total Hip Replacement Surgery, having failed outpatient conservative management. Prophylactic IV antibiotics were started before the procedure and continued for 24 hours post-op and transitioned to PO antibiotics to complete, in total, a 7 day course. They were admitted after surgery to the orthopedic floor. There were no complications during the hospital stay. All home medications were continued.     Routine consults were obtained from Physical Therapy for twice daily PT and from the Hospitalist for Medical Co-management. Patient was placed on anticoagulation. Pertinent home medications were continued. Daily labs were followed.      POD 0 pt was stable overnight. No major events post-op. Pt received PT twice daily. The plan is for DC to home with home PT. The orthopedic Attending is aware and agrees.    The patient had no post-operative complications and clinically progressed faster than anticipated. The following condition(s) were actively treated during the hospital stay:  HTN  BMI>=35  Obstructive Sleep Apnea  The patient met criteria for discharge before the 2nd night of the stay. The patient was appropriately and safely discharged home with home PT.   H&P:  Pt is a 43y Male    PAST MEDICAL & SURGICAL HISTORY:  HTN (hypertension)      Fatty liver      YISSEL on CPAP      Osteoarthritis      Morbid obesity      S/P cholecystectomy  2014      History of sleeve gastrectomy           Now s/p Left Total Hip Arthroplasty. Pt is afebrile with stable vital signs. Pain is controlled. Alert and Oriented. Exam reveals intact EHL FHL TA GS, +DP. Dressing is clean and dry.    Hospital Course:  Patient presented to [facility] medically cleared for elective Left Total Hip Replacement Surgery, having failed outpatient conservative management. Prophylactic IV antibiotics were started before the procedure and continued for 24 hours post-op and transitioned to PO antibiotics to complete, in total, a 7 day course. They were admitted after surgery to the orthopedic floor. There were no complications during the hospital stay. All home medications were continued.     Routine consults were obtained from Physical Therapy for twice daily PT and from the Hospitalist for Medical Co-management. Patient was placed on anticoagulation. Pertinent home medications were continued. Daily labs were followed.      POD 0 pt was stable overnight. No major events post-op. Pt received PT twice daily. The plan is for DC to home with home PT. The orthopedic Attending is aware and agrees.      The patient had no post-operative complications and clinically progressed faster than anticipated. The following condition(s) were actively treated during the hospital stay:  HTN  BMI>=35  Obstructive Sleep Apnea  The patient met criteria for discharge before the 2nd night of the stay. The patient was appropriately and safely discharged home with home PT.

## 2023-08-23 NOTE — DISCHARGE NOTE PROVIDER - NSDCMRMEDTOKEN_GEN_ALL_CORE_FT
hydroCHLOROthiazide 25 mg oral tablet: 1 orally once a day  lisinopril 40 mg oral tablet: 1 orally once a day  meloxicam 15 mg oral tablet: 1 orally once a day  Tylenol 325 mg oral capsule: 1 orally prn   Aspirin Enteric Coated 81 mg oral delayed release tablet: 1 tab(s) orally 2 times a day MDD: 2  cefadroxil 500 mg oral capsule: 1 cap(s) orally 2 times a day to complete a total 7 days of antibiotics after surgery; last dose (*POD7*) AM. Safely discard any remaining pills after this last dose. MDD: 2  CeleBREX 200 mg oral capsule: 1 cap(s) orally 2 times a day  hydroCHLOROthiazide 25 mg oral tablet: 1 orally once a day  lisinopril 40 mg oral tablet: 1 orally once a day  MiraLax oral powder for reconstitution: 17 gram(s) orally once a day as needed for  constipation  ondansetron 4 mg oral tablet: 1 tab(s) orally every 8 hours as needed for  nausea  oxyCODONE 5 mg oral tablet: 1 tab(s) orally every 4 hours as needed for  severe pain MDD: 6  Protonix 40 mg oral delayed release tablet: 1 tab(s) orally once a day  Senna 8.6 mg oral tablet: 2 tab(s) orally once a day (at bedtime) as needed for  constipation  Tylenol Extra Strength 500 mg oral tablet: 2 tab(s) orally every 8 hours

## 2023-08-23 NOTE — DISCHARGE NOTE PROVIDER - NSDCFUSCHEDAPPT_GEN_ALL_CORE_FT
[facility] Physician [facility]  ORTHOSUR [address] [facility]  Scheduled Appointment: 08/29/2023

## 2023-08-24 ENCOUNTER — TRANSCRIPTION ENCOUNTER (OUTPATIENT)
Age: 43
End: 2023-08-24

## 2023-08-24 VITALS
TEMPERATURE: 99 F | DIASTOLIC BLOOD PRESSURE: 61 MMHG | OXYGEN SATURATION: 98 % | HEART RATE: 69 BPM | RESPIRATION RATE: 18 BRPM | SYSTOLIC BLOOD PRESSURE: 117 MMHG

## 2023-08-24 LAB
ANION GAP SERPL CALC-SCNC: 5 MMOL/L — SIGNIFICANT CHANGE UP (ref 5–17)
BUN SERPL-MCNC: 17 MG/DL — SIGNIFICANT CHANGE UP (ref 7–23)
CALCIUM SERPL-MCNC: 8.4 MG/DL — LOW (ref 8.5–10.1)
CHLORIDE SERPL-SCNC: 108 MMOL/L — SIGNIFICANT CHANGE UP (ref 96–108)
CO2 SERPL-SCNC: 28 MMOL/L — SIGNIFICANT CHANGE UP (ref 22–31)
CREAT SERPL-MCNC: 0.81 MG/DL — SIGNIFICANT CHANGE UP (ref 0.5–1.3)
EGFR: 112 ML/MIN/1.73M2 — SIGNIFICANT CHANGE UP
GLUCOSE BLDC GLUCOMTR-MCNC: 194 MG/DL — HIGH (ref 70–99)
GLUCOSE SERPL-MCNC: 138 MG/DL — HIGH (ref 70–99)
HCT VFR BLD CALC: 34.6 % — LOW (ref 39–50)
HGB BLD-MCNC: 11.7 G/DL — LOW (ref 13–17)
MCHC RBC-ENTMCNC: 31.6 PG — SIGNIFICANT CHANGE UP (ref 27–34)
MCHC RBC-ENTMCNC: 33.8 GM/DL — SIGNIFICANT CHANGE UP (ref 32–36)
MCV RBC AUTO: 93.5 FL — SIGNIFICANT CHANGE UP (ref 80–100)
PLATELET # BLD AUTO: 216 K/UL — SIGNIFICANT CHANGE UP (ref 150–400)
POTASSIUM SERPL-MCNC: 3.4 MMOL/L — LOW (ref 3.5–5.3)
POTASSIUM SERPL-SCNC: 3.4 MMOL/L — LOW (ref 3.5–5.3)
RBC # BLD: 3.7 M/UL — LOW (ref 4.2–5.8)
RBC # FLD: 12 % — SIGNIFICANT CHANGE UP (ref 10.3–14.5)
SODIUM SERPL-SCNC: 141 MMOL/L — SIGNIFICANT CHANGE UP (ref 135–145)
WBC # BLD: 10.88 K/UL — HIGH (ref 3.8–10.5)
WBC # FLD AUTO: 10.88 K/UL — HIGH (ref 3.8–10.5)

## 2023-08-24 PROCEDURE — 99221 1ST HOSP IP/OBS SF/LOW 40: CPT

## 2023-08-24 RX ORDER — POTASSIUM CHLORIDE 20 MEQ
40 PACKET (EA) ORAL ONCE
Refills: 0 | Status: COMPLETED | OUTPATIENT
Start: 2023-08-24 | End: 2023-08-24

## 2023-08-24 RX ADMIN — OXYCODONE HYDROCHLORIDE 10 MILLIGRAM(S): 5 TABLET ORAL at 14:30

## 2023-08-24 RX ADMIN — Medication 325 MILLIGRAM(S): at 09:16

## 2023-08-24 RX ADMIN — Medication 1000 MILLIGRAM(S): at 06:45

## 2023-08-24 RX ADMIN — Medication 250 MILLIGRAM(S): at 18:21

## 2023-08-24 RX ADMIN — OXYCODONE HYDROCHLORIDE 10 MILLIGRAM(S): 5 TABLET ORAL at 13:34

## 2023-08-24 RX ADMIN — CELECOXIB 200 MILLIGRAM(S): 200 CAPSULE ORAL at 09:16

## 2023-08-24 RX ADMIN — Medication 1000 MILLIGRAM(S): at 13:32

## 2023-08-24 RX ADMIN — OXYCODONE HYDROCHLORIDE 10 MILLIGRAM(S): 5 TABLET ORAL at 10:11

## 2023-08-24 RX ADMIN — Medication 101.6 MILLIGRAM(S): at 06:45

## 2023-08-24 RX ADMIN — Medication 250 MILLIGRAM(S): at 13:32

## 2023-08-24 RX ADMIN — LISINOPRIL 40 MILLIGRAM(S): 2.5 TABLET ORAL at 09:16

## 2023-08-24 RX ADMIN — OXYCODONE HYDROCHLORIDE 10 MILLIGRAM(S): 5 TABLET ORAL at 01:59

## 2023-08-24 RX ADMIN — Medication 81 MILLIGRAM(S): at 09:16

## 2023-08-24 RX ADMIN — Medication 500 MILLIGRAM(S): at 09:16

## 2023-08-24 RX ADMIN — OXYCODONE HYDROCHLORIDE 10 MILLIGRAM(S): 5 TABLET ORAL at 02:29

## 2023-08-24 RX ADMIN — Medication 40 MILLIEQUIVALENT(S): at 09:16

## 2023-08-24 RX ADMIN — SODIUM CHLORIDE 75 MILLILITER(S): 9 INJECTION, SOLUTION INTRAVENOUS at 01:48

## 2023-08-24 RX ADMIN — Medication 100 MILLIGRAM(S): at 05:41

## 2023-08-24 RX ADMIN — Medication 1 TABLET(S): at 09:16

## 2023-08-24 RX ADMIN — Medication 1 MILLIGRAM(S): at 09:16

## 2023-08-24 RX ADMIN — PANTOPRAZOLE SODIUM 40 MILLIGRAM(S): 20 TABLET, DELAYED RELEASE ORAL at 06:45

## 2023-08-24 RX ADMIN — OXYCODONE HYDROCHLORIDE 10 MILLIGRAM(S): 5 TABLET ORAL at 09:16

## 2023-08-24 NOTE — PROGRESS NOTE ADULT - SUBJECTIVE AND OBJECTIVE BOX
Patient seen and examined at bedside. Pain well controlled with medication. Patient denies any numbness, tingling, weakness, or any other orthopaedic complaint.     LABS:                        13.4   15.14 )-----------( 222      ( 23 Aug 2023 16:15 )             39.3     08-23    138  |  109<H>  |  14  ----------------------------<  157<H>  3.5   |  26  |  0.66    Ca    8.4<L>      23 Aug 2023 16:15        Urinalysis Basic - ( 23 Aug 2023 16:15 )    Color: x / Appearance: x / SG: x / pH: x  Gluc: 157 mg/dL / Ketone: x  / Bili: x / Urobili: x   Blood: x / Protein: x / Nitrite: x   Leuk Esterase: x / RBC: x / WBC x   Sq Epi: x / Non Sq Epi: x / Bacteria: x        VITAL SIGNS:  T(C): 36.5 (08-24-23 @ 00:10), Max: 37 (08-23-23 @ 20:16)  HR: 74 (08-24-23 @ 00:10) (53 - 93)  BP: 119/63 (08-24-23 @ 00:10) (119/63 - 156/100)  RR: 17 (08-24-23 @ 00:10) (12 - 20)  SpO2: 99% (08-24-23 @ 00:10) (99% - 100%)    Gen: Resting in bed, no acute distress  LLE  NAVID Dressing in place, clean/dry/intact.   Ping-incisional tenderness to palpation; otherwise, NTTP throughout the rest of the extremity.   SILT L2-S1.  GSC/TA/EHL/FHL intact. Q/H unable to assess 2' pain.   DP pulse palpable.   No calf tenderness bilaterally.   Compartments soft and compressible.             Assessment and Plan:  43y Male POD1 L KAYLAN    Follow up postoperative labs  WBAT/PT/OT Posterior precautions, pink pillow  Pain management PRN  Continue prophylactic antibiotics  DVT PPx: hold until POD1, A81  Incentive spirometry  Dispo: Home PT  Ortho stable for DC  Will discuss with Dr. Cervantes and advise of any changes to the plan.

## 2023-08-24 NOTE — OCCUPATIONAL THERAPY INITIAL EVALUATION ADULT - ADL RETRAINING, OT EVAL
Patient will participate in lower body dressing with MOD I   in 2 weeks  Patient will participate in toilet transfer with MOD I   in 2 weeks  Patient will participate in toileting with MOD I     in 2 weeks  Patient will participate in grooming standing at the sink with  MOD I  in 2 weeks

## 2023-08-24 NOTE — DISCHARGE NOTE NURSING/CASE MANAGEMENT/SOCIAL WORK - PATIENT PORTAL LINK FT
You can access the FollowMyHealth Patient Portal offered by [facility] by registering at the following website: [email or web address]. By joining [facility]’s FollowMyHealth portal, you will also be able to view your health information using other applications (apps) compatible with our system.

## 2023-08-24 NOTE — OCCUPATIONAL THERAPY INITIAL EVALUATION ADULT - LIVES WITH, PROFILE
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

## 2023-08-24 NOTE — OCCUPATIONAL THERAPY INITIAL EVALUATION ADULT - NSACTIVITYREC_GEN_A_OT
Patient educated on PHP  and how to participate in dressing tasks, grooming tasks, toileting tasks, bed mobility and  all functional transfers while abiding by precautions. Handout provided to promote carryover. Pt educated on DME and AE throughout session and how to self purchase

## 2023-08-24 NOTE — OCCUPATIONAL THERAPY INITIAL EVALUATION ADULT - EATING, PREVIOUS LEVEL OF FUNCTION, OT EVAL
patient called and informed no bacteria in urine, states feels well, she will be following up with urology, and encouraged to do so.  Insormed to continue antibitoic unti end, if any further problems to see PCP, states understanding      Electronically signed by:DENISE COHEN   May 31 2017  9:29AM CST     independent

## 2023-08-24 NOTE — CONSULT NOTE ADULT - SUBJECTIVE AND OBJECTIVE BOX
PCP    Patient is a 43y old  Male who presents with a chief complaint of Left Total Hip Arthroplasty (23 Aug 2023 08:40)        HPI:      Review of system- All 10 systems reviewed and is as per HPI otherwise negative.           T(C): 36.8 (08-24-23 @ 07:52), Max: 37 (08-23-23 @ 20:16)  HR: 79 (08-24-23 @ 07:52) (53 - 93)  BP: 132/72 (08-24-23 @ 07:52) (102/64 - 156/100)  RR: 16 (08-24-23 @ 07:52) (12 - 20)  SpO2: 100% (08-24-23 @ 07:52) (99% - 100%)  Wt(kg): --      LABS:                        11.7   10.88 )-----------( 216      ( 24 Aug 2023 07:39 )             34.6     08-24    141  |  108  |  17  ----------------------------<  138<H>  3.4<L>   |  28  |  0.81    Ca    8.4<L>      24 Aug 2023 07:39        Urinalysis Basic - ( 24 Aug 2023 07:39 )    Color: x / Appearance: x / SG: x / pH: x  Gluc: 138 mg/dL / Ketone: x  / Bili: x / Urobili: x   Blood: x / Protein: x / Nitrite: x   Leuk Esterase: x / RBC: x / WBC x   Sq Epi: x / Non Sq Epi: x / Bacteria: x        CAPILLARY BLOOD GLUCOSE      POCT Blood Glucose.: 194 mg/dL (24 Aug 2023 02:01)  POCT Blood Glucose.: 145 mg/dL (23 Aug 2023 15:59)  POCT Blood Glucose.: 85 mg/dL (23 Aug 2023 10:24)      CAPILLARY BLOOD GLUCOSE      POCT Blood Glucose.: 194 mg/dL (24 Aug 2023 02:01)  POCT Blood Glucose.: 145 mg/dL (23 Aug 2023 15:59)  POCT Blood Glucose.: 85 mg/dL (23 Aug 2023 10:24)    CAPILLARY BLOOD GLUCOSE      POCT Blood Glucose.: 194 mg/dL (24 Aug 2023 02:01)            RECENT CULTURES:      RADIOLOGY & ADDITIONAL TESTS:      PHYSICAL EXAM:    GENERAL: NAD, well-groomed, well-developed  HEAD:  Atraumatic, Normocephalic  EYES: EOMI, PERRLA, conjunctiva and sclera clear  HEENT: Moist mucous membranes  NECK: Supple, No JVD  NERVOUS SYSTEM:  Alert & Oriented X3, Motor Strength 5/5 B/L upper and lower extremities; DTRs 2+ intact and symmetric  CHEST/LUNG: Clear to auscultation bilaterally; No rales, rhonchi, wheezing, or rubs  HEART: Regular rate and rhythm; No murmurs, rubs, or gallops  ABDOMEN: Soft, Nontender, Nondistended; Bowel sounds present  GENITOURINARY- Voiding, no palpable bladder  EXTREMITIES:  2+ Peripheral Pulses, No clubbing, cyanosis, or edema  MUSCULOSKELTAL- No muscle tenderness, Muscle tone normal, No joint tenderness, no Joint swelling, Joint range of motion-normal  SKIN-no rash, no lesion  CNS- alert, oriented X3, non focal       Daily Height in cm: 175.26 (23 Aug 2023 09:49)    Daily       acetaminophen     Tablet .. 1000 milliGRAM(s) Oral every 8 hours  ascorbic acid 500 milliGRAM(s) Oral two times a day  aspirin enteric coated 81 milliGRAM(s) Oral two times a day  celecoxib 200 milliGRAM(s) Oral every 12 hours  cephalexin 250 milliGRAM(s) Oral every 6 hours  ferrous    sulfate 325 milliGRAM(s) Oral daily  folic acid 1 milliGRAM(s) Oral daily  hydrochlorothiazide 25 milliGRAM(s) Oral daily  HYDROmorphone  Injectable 0.5 milliGRAM(s) SubCutaneous every 4 hours PRN  lactated ringers. 1000 milliLiter(s) IV Continuous <Continuous>  lisinopril 40 milliGRAM(s) Oral daily  multivitamin 1 Tablet(s) Oral daily  ondansetron Injectable 8 milliGRAM(s) IV Push every 8 hours PRN  oxyCODONE    IR 10 milliGRAM(s) Oral every 4 hours PRN  oxyCODONE    IR 5 milliGRAM(s) Oral every 4 hours PRN  pantoprazole    Tablet 40 milliGRAM(s) Oral before breakfast  polyethylene glycol 3350 17 Gram(s) Oral at bedtime  prochlorperazine   Injectable 10 milliGRAM(s) IV Push every 8 hours PRN  senna 2 Tablet(s) Oral at bedtime  tranexamic acid IVPB 1000 milliGRAM(s) IV Intermittent once  tranexamic acid IVPB 1000 milliGRAM(s) IV Intermittent once        Assessment    Plan       CC- LT THR    HPI:  44yo/M with PMH morbid obesity s/p sleeve, sleep apnea, HTN, OA underwent elective LT THR. Patient had traumatic injury to the hip, he failed outpatient treatmetn and scheduled for elective replacement. Hospitalist consulted for postop medical management    PMH- as above  PSH- sleeve gastrectomy, cholecystectomy  Soc hx- denies smoking, no alcohol  Fam hx- f CAD/diabetes, m diabetes    8/24/23- up and ambulated with PT, has some pain but controlled    Review of system- All 10 systems reviewed and is as per HPI otherwise negative.     T(C): 36.8 (08-24-23 @ 07:52), Max: 37 (08-23-23 @ 20:16)  HR: 79 (08-24-23 @ 07:52) (53 - 93)  BP: 132/72 (08-24-23 @ 07:52) (102/64 - 156/100)  RR: 16 (08-24-23 @ 07:52) (12 - 20)  SpO2: 100% (08-24-23 @ 07:52) (99% - 100%)  Wt(kg): --    LABS:                        11.7   10.88 )-----------( 216      ( 24 Aug 2023 07:39 )             34.6     08-24    141  |  108  |  17  ----------------------------<  138<H>  3.4<L>   |  28  |  0.81    Ca    8.4<L>      24 Aug 2023 07:39    Urinalysis Basic - ( 24 Aug 2023 07:39 )  Color: x / Appearance: x / SG: x / pH: x  Gluc: 138 mg/dL / Ketone: x  / Bili: x / Urobili: x   Blood: x / Protein: x / Nitrite: x   Leuk Esterase: x / RBC: x / WBC x   Sq Epi: x / Non Sq Epi: x / Bacteria: x    CAPILLARY BLOOD GLUCOSE  POCT Blood Glucose.: 194 mg/dL (24 Aug 2023 02:01)  POCT Blood Glucose.: 145 mg/dL (23 Aug 2023 15:59)  POCT Blood Glucose.: 85 mg/dL (23 Aug 2023 10:24)    RADIOLOGY & ADDITIONAL TESTS:      PHYSICAL EXAM:  GENERAL: NAD, well-groomed, well-developed  HEAD:  Atraumatic, Normocephalic  EYES: EOMI, PERRLA, conjunctiva and sclera clear  HEENT: Moist mucous membranes  NECK: Supple, No JVD  NERVOUS SYSTEM:  Alert & Oriented X3, Motor Strength 5/5 B/L upper and lower extremities; DTRs 2+ intact and symmetric  CHEST/LUNG: Clear to auscultation bilaterally; No rales, rhonchi, wheezing, or rubs  HEART: Regular rate and rhythm; No murmurs, rubs, or gallops  ABDOMEN: Soft, Nontender, Nondistended; Bowel sounds present  GENITOURINARY- Voiding, no palpable bladder  EXTREMITIES:  2+ Peripheral Pulses, No clubbing, cyanosis, or edema  MUSCULOSKELTAL- LT hip dressing dry  SKIN-no rash, no lesion  CNS- alert, oriented X3, non focal       Daily Height in cm: 175.26 (23 Aug 2023 09:49)    Daily     MEDICATIONS  (STANDING):  acetaminophen     Tablet .. 1000 milliGRAM(s) Oral every 8 hours  ascorbic acid 500 milliGRAM(s) Oral two times a day  aspirin enteric coated 81 milliGRAM(s) Oral two times a day  celecoxib 200 milliGRAM(s) Oral every 12 hours  cephalexin 250 milliGRAM(s) Oral every 6 hours  ferrous    sulfate 325 milliGRAM(s) Oral daily  folic acid 1 milliGRAM(s) Oral daily  hydrochlorothiazide 25 milliGRAM(s) Oral daily  lactated ringers. 1000 milliLiter(s) (75 mL/Hr) IV Continuous <Continuous>  lisinopril 40 milliGRAM(s) Oral daily  multivitamin 1 Tablet(s) Oral daily  pantoprazole    Tablet 40 milliGRAM(s) Oral before breakfast  polyethylene glycol 3350 17 Gram(s) Oral at bedtime  senna 2 Tablet(s) Oral at bedtime  tranexamic acid IVPB 1000 milliGRAM(s) IV Intermittent once  tranexamic acid IVPB 1000 milliGRAM(s) IV Intermittent once    MEDICATIONS  (PRN):  HYDROmorphone  Injectable 0.5 milliGRAM(s) SubCutaneous every 4 hours PRN Severe Pain (7 - 10)  ondansetron Injectable 8 milliGRAM(s) IV Push every 8 hours PRN Nausea and/or Vomiting  oxyCODONE    IR 10 milliGRAM(s) Oral every 4 hours PRN Moderate Pain (4 - 6)  oxyCODONE    IR 5 milliGRAM(s) Oral every 4 hours PRN Mild Pain (1 - 3)  prochlorperazine   Injectable 10 milliGRAM(s) IV Push every 8 hours PRN Nausea and/or Vomiting    Assessment/Plan  #S/p LT THR  Ortho following  PT as tolerated  Pain meds prn  Incentive spirometry  Bowel regimen    #HTN- BP stable, cont lisinopril    #DVT proph- per ortho team    #Dispo- thank you for ocnsult, will follow with you

## 2023-08-24 NOTE — OCCUPATIONAL THERAPY INITIAL EVALUATION ADULT - GENERAL OBSERVATIONS, REHAB EVAL
Patient received semi-supine in bed, NAD, vss  . Patient left seated in chair    , VSS, NAD, all lines intact and all items within reach.

## 2023-08-25 ENCOUNTER — TRANSCRIPTION ENCOUNTER (OUTPATIENT)
Age: 43
End: 2023-08-25

## 2023-08-29 ENCOUNTER — NON-APPOINTMENT (OUTPATIENT)
Age: 43
End: 2023-08-29

## 2023-08-29 ENCOUNTER — APPOINTMENT (OUTPATIENT)
Dept: ORTHOPEDIC SURGERY | Facility: CLINIC | Age: 43
End: 2023-08-29
Payer: MEDICAID

## 2023-08-29 PROBLEM — M19.90 UNSPECIFIED OSTEOARTHRITIS, UNSPECIFIED SITE: Chronic | Status: ACTIVE | Noted: 2023-08-16

## 2023-08-29 PROBLEM — E66.01 MORBID (SEVERE) OBESITY DUE TO EXCESS CALORIES: Chronic | Status: ACTIVE | Noted: 2023-08-16

## 2023-08-29 PROCEDURE — 73502 X-RAY EXAM HIP UNI 2-3 VIEWS: CPT | Mod: LT

## 2023-08-29 PROCEDURE — 99024 POSTOP FOLLOW-UP VISIT: CPT

## 2023-08-29 NOTE — HISTORY OF PRESENT ILLNESS
[TextEntry] : Patient here for follow-up status post left total hip replacement on 8/23/2023.  Patient is currently at home. Is taking Tylenol during the day and 1 oxycodone at night for pain.  Is on aspirin for DVT prophylaxis.  Ambulating with a cane.  Left hip Incision healing well without signs of infection, small intact blister from Tegaderm was applied over top of Aquacel dressing near pin sites, skin irritation from reinforcement Tegaderms over Eduardo dressing. No pain with ROM Sensation intact to light touch throughout foot/LE Positive dorsiflexion/plantarflexion/EHL/FHL  Imaging  Hardware in good alignment  Assessment/plan Patient doing well.  Continue physical therapy/encourage ambulation, prescription given.  Pain control as needed.  Prescription for Venelex ointment for blistering irritated skin given.  Patient to return immediately if any concerns about incision healing or infections.  Patient to follow-up in 4 weeks.

## 2023-08-30 NOTE — CONSULT NOTE ADULT - ASSESSMENT
Render Risk Assessment In Note?: no
Detail Level: Generalized
Comment: Pt states last Tremfya injection was June 2023 and has d/c medication due to becoming pregnant. Pt seeking options that are safe for pregnancy.
38M with cellulitis isolated to left ear lobule.  No abscess on attempted I&D, no cartilage involvement, no extension onto face, neck or mastoid.  - no further ENT needs  - mupirocin ointment BID to lobule for 7 days  - clindamycin vs abx of ED choice, 7 days  - dispo per ED  - will discuss with attending

## 2023-08-31 DIAGNOSIS — I10 ESSENTIAL (PRIMARY) HYPERTENSION: ICD-10-CM

## 2023-08-31 DIAGNOSIS — Z88.6 ALLERGY STATUS TO ANALGESIC AGENT: ICD-10-CM

## 2023-08-31 DIAGNOSIS — E66.01 MORBID (SEVERE) OBESITY DUE TO EXCESS CALORIES: ICD-10-CM

## 2023-08-31 DIAGNOSIS — K76.0 FATTY (CHANGE OF) LIVER, NOT ELSEWHERE CLASSIFIED: ICD-10-CM

## 2023-08-31 DIAGNOSIS — M87.852 OTHER OSTEONECROSIS, LEFT FEMUR: ICD-10-CM

## 2023-08-31 DIAGNOSIS — Z20.822 CONTACT WITH AND (SUSPECTED) EXPOSURE TO COVID-19: ICD-10-CM

## 2023-08-31 DIAGNOSIS — G47.33 OBSTRUCTIVE SLEEP APNEA (ADULT) (PEDIATRIC): ICD-10-CM

## 2023-08-31 DIAGNOSIS — Z88.0 ALLERGY STATUS TO PENICILLIN: ICD-10-CM

## 2023-08-31 DIAGNOSIS — Z87.891 PERSONAL HISTORY OF NICOTINE DEPENDENCE: ICD-10-CM

## 2023-08-31 DIAGNOSIS — Z98.84 BARIATRIC SURGERY STATUS: ICD-10-CM

## 2023-08-31 DIAGNOSIS — M16.12 UNILATERAL PRIMARY OSTEOARTHRITIS, LEFT HIP: ICD-10-CM

## 2023-08-31 DIAGNOSIS — Z99.89 DEPENDENCE ON OTHER ENABLING MACHINES AND DEVICES: ICD-10-CM

## 2023-08-31 LAB — SURGICAL PATHOLOGY STUDY: SIGNIFICANT CHANGE UP

## 2023-09-03 ENCOUNTER — NON-APPOINTMENT (OUTPATIENT)
Age: 43
End: 2023-09-03

## 2023-09-08 ENCOUNTER — TRANSCRIPTION ENCOUNTER (OUTPATIENT)
Age: 43
End: 2023-09-08

## 2023-09-08 ENCOUNTER — APPOINTMENT (OUTPATIENT)
Dept: ORTHOPEDIC SURGERY | Facility: CLINIC | Age: 43
End: 2023-09-08

## 2023-09-11 ENCOUNTER — RX RENEWAL (OUTPATIENT)
Age: 43
End: 2023-09-11

## 2023-09-25 ENCOUNTER — TRANSCRIPTION ENCOUNTER (OUTPATIENT)
Age: 43
End: 2023-09-25

## 2023-09-29 ENCOUNTER — APPOINTMENT (OUTPATIENT)
Dept: ORTHOPEDIC SURGERY | Facility: CLINIC | Age: 43
End: 2023-09-29
Payer: MEDICAID

## 2023-09-29 VITALS
HEIGHT: 69 IN | SYSTOLIC BLOOD PRESSURE: 135 MMHG | DIASTOLIC BLOOD PRESSURE: 94 MMHG | WEIGHT: 250 LBS | HEART RATE: 64 BPM | BODY MASS INDEX: 37.03 KG/M2

## 2023-09-29 PROCEDURE — 99024 POSTOP FOLLOW-UP VISIT: CPT

## 2023-09-29 PROCEDURE — 73502 X-RAY EXAM HIP UNI 2-3 VIEWS: CPT | Mod: LT

## 2023-10-02 ENCOUNTER — RX RENEWAL (OUTPATIENT)
Age: 43
End: 2023-10-02

## 2023-10-24 ENCOUNTER — TRANSCRIPTION ENCOUNTER (OUTPATIENT)
Age: 43
End: 2023-10-24

## 2023-11-09 ENCOUNTER — APPOINTMENT (OUTPATIENT)
Dept: ORTHOPEDIC SURGERY | Facility: CLINIC | Age: 43
End: 2023-11-09
Payer: COMMERCIAL

## 2023-11-09 PROCEDURE — 73502 X-RAY EXAM HIP UNI 2-3 VIEWS: CPT | Mod: RT

## 2023-11-09 PROCEDURE — 99024 POSTOP FOLLOW-UP VISIT: CPT

## 2023-12-10 ENCOUNTER — NON-APPOINTMENT (OUTPATIENT)
Age: 43
End: 2023-12-10

## 2023-12-29 ENCOUNTER — RX RENEWAL (OUTPATIENT)
Age: 43
End: 2023-12-29

## 2024-02-28 NOTE — INPATIENT CERTIFICATION FOR MEDICARE PATIENTS - NS ICMP TWO DAYS INPATIENT
Letter written for patient, advised on pain medication.    Patient no longer taking anything for pain   Yes

## 2024-04-02 ENCOUNTER — RX RENEWAL (OUTPATIENT)
Age: 44
End: 2024-04-02

## 2024-04-02 RX ORDER — HYDROCHLOROTHIAZIDE 25 MG/1
25 TABLET ORAL
Qty: 90 | Refills: 0 | Status: ACTIVE | COMMUNITY
Start: 2019-12-16 | End: 1900-01-01

## 2024-04-04 ENCOUNTER — APPOINTMENT (OUTPATIENT)
Dept: INTERNAL MEDICINE | Facility: CLINIC | Age: 44
End: 2024-04-04
Payer: COMMERCIAL

## 2024-04-04 VITALS
DIASTOLIC BLOOD PRESSURE: 80 MMHG | TEMPERATURE: 97.8 F | BODY MASS INDEX: 39.1 KG/M2 | SYSTOLIC BLOOD PRESSURE: 110 MMHG | HEIGHT: 69 IN | HEART RATE: 65 BPM | WEIGHT: 264 LBS | OXYGEN SATURATION: 97 %

## 2024-04-04 DIAGNOSIS — I10 ESSENTIAL (PRIMARY) HYPERTENSION: ICD-10-CM

## 2024-04-04 DIAGNOSIS — Z00.00 ENCOUNTER FOR GENERAL ADULT MEDICAL EXAMINATION W/OUT ABNORMAL FINDINGS: ICD-10-CM

## 2024-04-04 DIAGNOSIS — E55.9 VITAMIN D DEFICIENCY, UNSPECIFIED: ICD-10-CM

## 2024-04-04 PROCEDURE — 36415 COLL VENOUS BLD VENIPUNCTURE: CPT

## 2024-04-04 PROCEDURE — 99213 OFFICE O/P EST LOW 20 MIN: CPT

## 2024-04-04 RX ORDER — MELOXICAM 15 MG/1
15 TABLET ORAL DAILY
Qty: 30 | Refills: 1 | Status: DISCONTINUED | COMMUNITY
Start: 2023-01-12 | End: 2024-04-04

## 2024-04-04 RX ORDER — CELECOXIB 200 MG/1
200 CAPSULE ORAL TWICE DAILY
Qty: 60 | Refills: 1 | Status: DISCONTINUED | COMMUNITY
Start: 2023-09-11 | End: 2024-04-04

## 2024-04-04 RX ORDER — CASTOR OIL AND BALSAM, PERU 788; 87 MG/G; MG/G
OINTMENT TOPICAL
Qty: 1 | Refills: 0 | Status: DISCONTINUED | COMMUNITY
Start: 2023-08-29 | End: 2024-04-04

## 2024-04-04 NOTE — ASSESSMENT
[FreeTextEntry1] : 1. HTN: Continue Lisinopril 40mg daily and HCTZ 25mg daily. Limit sodium intake to 2g daily, weekly exercise of 150 minutes or more.  Patient is fasting today, appropriate labs were drawn in office today. Advised to make appt for CPE.

## 2024-04-04 NOTE — HISTORY OF PRESENT ILLNESS
[FreeTextEntry1] : Follow up. [de-identified] : 44 year male presents to the office for follow up on HTN. Currently taking Lisinopril 40mg daily and HCTZ 25mg daily, tolerating well, no side effects.  Denies chest pain, palpitations, SOB, headache, lightheadedness or dizziness.

## 2024-04-14 ENCOUNTER — NON-APPOINTMENT (OUTPATIENT)
Age: 44
End: 2024-04-14

## 2024-04-15 LAB
25(OH)D3 SERPL-MCNC: 15.4 NG/ML
ALBUMIN SERPL ELPH-MCNC: 4.1 G/DL
ALP BLD-CCNC: 73 U/L
ALT SERPL-CCNC: 23 U/L
ANION GAP SERPL CALC-SCNC: 9 MMOL/L
AST SERPL-CCNC: 16 U/L
BASOPHILS # BLD AUTO: 0.03 K/UL
BASOPHILS NFR BLD AUTO: 0.6 %
BILIRUB SERPL-MCNC: 1.1 MG/DL
BUN SERPL-MCNC: 13 MG/DL
CALCIUM SERPL-MCNC: 9.4 MG/DL
CHLORIDE SERPL-SCNC: 103 MMOL/L
CHOLEST SERPL-MCNC: 188 MG/DL
CO2 SERPL-SCNC: 28 MMOL/L
CREAT SERPL-MCNC: 0.7 MG/DL
EGFR: 117 ML/MIN/1.73M2
EOSINOPHIL # BLD AUTO: 0.05 K/UL
EOSINOPHIL NFR BLD AUTO: 1 %
ESTIMATED AVERAGE GLUCOSE: 80 MG/DL
GLUCOSE SERPL-MCNC: 77 MG/DL
HBA1C MFR BLD HPLC: 4.4 %
HCT VFR BLD CALC: 44.6 %
HDLC SERPL-MCNC: 59 MG/DL
HGB BLD-MCNC: 14.4 G/DL
IMM GRANULOCYTES NFR BLD AUTO: 0.2 %
LDLC SERPL CALC-MCNC: 114 MG/DL
LYMPHOCYTES # BLD AUTO: 2.01 K/UL
LYMPHOCYTES NFR BLD AUTO: 39.2 %
MAN DIFF?: NORMAL
MCHC RBC-ENTMCNC: 30.4 PG
MCHC RBC-ENTMCNC: 32.3 GM/DL
MCV RBC AUTO: 94.1 FL
MONOCYTES # BLD AUTO: 0.33 K/UL
MONOCYTES NFR BLD AUTO: 6.4 %
NEUTROPHILS # BLD AUTO: 2.7 K/UL
NEUTROPHILS NFR BLD AUTO: 52.6 %
NONHDLC SERPL-MCNC: 129 MG/DL
PLATELET # BLD AUTO: 220 K/UL
POTASSIUM SERPL-SCNC: 4.2 MMOL/L
PROT SERPL-MCNC: 7 G/DL
RBC # BLD: 4.74 M/UL
RBC # FLD: 11.9 %
SODIUM SERPL-SCNC: 140 MMOL/L
TRIGL SERPL-MCNC: 80 MG/DL
TSH SERPL-ACNC: 1.69 UIU/ML
WBC # FLD AUTO: 5.13 K/UL

## 2024-04-15 RX ORDER — ERGOCALCIFEROL 1.25 MG/1
1.25 MG CAPSULE ORAL WEEKLY
Qty: 8 | Refills: 0 | Status: ACTIVE | COMMUNITY
Start: 2024-04-15 | End: 1900-01-01

## 2024-04-18 ENCOUNTER — NON-APPOINTMENT (OUTPATIENT)
Age: 44
End: 2024-04-18

## 2024-05-16 ENCOUNTER — RX RENEWAL (OUTPATIENT)
Age: 44
End: 2024-05-16

## 2024-05-16 RX ORDER — LISINOPRIL 40 MG/1
40 TABLET ORAL
Qty: 90 | Refills: 1 | Status: ACTIVE | COMMUNITY
Start: 2022-12-20 | End: 1900-01-01

## 2024-05-31 ENCOUNTER — APPOINTMENT (OUTPATIENT)
Dept: ORTHOPEDIC SURGERY | Facility: CLINIC | Age: 44
End: 2024-05-31
Payer: COMMERCIAL

## 2024-05-31 DIAGNOSIS — Z96.642 PRESENCE OF LEFT ARTIFICIAL HIP JOINT: ICD-10-CM

## 2024-05-31 PROCEDURE — 73502 X-RAY EXAM HIP UNI 2-3 VIEWS: CPT | Mod: LT

## 2024-05-31 PROCEDURE — 99213 OFFICE O/P EST LOW 20 MIN: CPT

## 2024-05-31 NOTE — DISCUSSION/SUMMARY
[de-identified] : Patient doing well.  I encouraged physical activity and stretching.  Patient to follow-up annually or sooner if any concerns.

## 2024-05-31 NOTE — PHYSICAL EXAM
[de-identified] : Left hip No pain with ROM Sensation intact to light touch throughout foot/LE Positive dorsiflexion/plantarflexion/EHL/FHL [de-identified] : 5/31/24: AP pelvis and lateral left hip-hardware in good alignment

## 2024-05-31 NOTE — HISTORY OF PRESENT ILLNESS
[de-identified] : 5/31/24: Patient here for follow-up status post left total hip replacement on 8/23/2023. Is not taking anything for pain.  States the hip feels great.  Occasional stiffness in the morning but otherwise no issues.

## 2024-07-03 ENCOUNTER — RX RENEWAL (OUTPATIENT)
Age: 44
End: 2024-07-03

## 2024-07-03 ENCOUNTER — APPOINTMENT (OUTPATIENT)
Dept: CARDIOLOGY | Facility: CLINIC | Age: 44
End: 2024-07-03

## 2024-07-20 ENCOUNTER — NON-APPOINTMENT (OUTPATIENT)
Age: 44
End: 2024-07-20

## 2024-08-08 ENCOUNTER — APPOINTMENT (OUTPATIENT)
Dept: INTERNAL MEDICINE | Facility: CLINIC | Age: 44
End: 2024-08-08

## 2024-10-02 ENCOUNTER — RX RENEWAL (OUTPATIENT)
Age: 44
End: 2024-10-02

## 2024-11-27 NOTE — H&P PST ADULT - NS SC CAGE ALCOHOL EYE OPENER
acetaminophen 500 mg oral tablet: 2 tab(s) orally every 6 hours  amiodarone 200 mg oral tablet: 1 tab(s) orally once a day Please take 2 tabs every 12 hours for 2 days and then take 1 tab every 24 hours there after.  amLODIPine 2.5 mg oral tablet: 1 tab(s) orally once a day  aspirin 81 mg oral delayed release tablet: 1 tab(s) orally once a day  atorvastatin 40 mg oral tablet: 1 tab(s) orally once a day (at bedtime)  furosemide 40 mg oral tablet: 1 tab(s) orally once a day  metoprolol tartrate 50 mg oral tablet: 1 tab(s) orally 2 times a day  oxyCODONE 5 mg oral tablet: 1 tab(s) orally every 4 hours as needed for Moderate Pain (4 - 6) MDD: 6  pantoprazole 40 mg oral delayed release tablet: 1 tab(s) orally once a day (before a meal)  polyethylene glycol 3350 oral powder for reconstitution: 17 gram(s) orally once a day  Potassium Chloride (Eqv-Klor-Con M20) 20 mEq oral tablet, extended release: 1 tab(s) orally once a day  senna leaf extract oral tablet: 2 tab(s) orally once a day (at bedtime)   no

## 2024-12-02 ENCOUNTER — RX RENEWAL (OUTPATIENT)
Age: 44
End: 2024-12-02

## 2024-12-12 ENCOUNTER — APPOINTMENT (OUTPATIENT)
Dept: INTERNAL MEDICINE | Facility: CLINIC | Age: 44
End: 2024-12-12
Payer: COMMERCIAL

## 2024-12-12 ENCOUNTER — NON-APPOINTMENT (OUTPATIENT)
Age: 44
End: 2024-12-12

## 2024-12-12 VITALS
OXYGEN SATURATION: 99 % | TEMPERATURE: 98.3 F | HEIGHT: 69 IN | SYSTOLIC BLOOD PRESSURE: 128 MMHG | WEIGHT: 270 LBS | BODY MASS INDEX: 39.99 KG/M2 | HEART RATE: 56 BPM | DIASTOLIC BLOOD PRESSURE: 78 MMHG

## 2024-12-12 DIAGNOSIS — I10 ESSENTIAL (PRIMARY) HYPERTENSION: ICD-10-CM

## 2024-12-12 DIAGNOSIS — Z00.00 ENCOUNTER FOR GENERAL ADULT MEDICAL EXAMINATION W/OUT ABNORMAL FINDINGS: ICD-10-CM

## 2024-12-12 DIAGNOSIS — E66.01 MORBID (SEVERE) OBESITY DUE TO EXCESS CALORIES: ICD-10-CM

## 2024-12-12 DIAGNOSIS — E55.9 VITAMIN D DEFICIENCY, UNSPECIFIED: ICD-10-CM

## 2024-12-12 PROCEDURE — 99396 PREV VISIT EST AGE 40-64: CPT

## 2024-12-12 PROCEDURE — 93000 ELECTROCARDIOGRAM COMPLETE: CPT

## 2024-12-19 LAB
25(OH)D3 SERPL-MCNC: 22.7 NG/ML
ALBUMIN SERPL ELPH-MCNC: 4.3 G/DL
ALP BLD-CCNC: 78 U/L
ALT SERPL-CCNC: 21 U/L
ANION GAP SERPL CALC-SCNC: 11 MMOL/L
AST SERPL-CCNC: 16 U/L
BILIRUB SERPL-MCNC: 0.8 MG/DL
BUN SERPL-MCNC: 17 MG/DL
CALCIUM SERPL-MCNC: 9.3 MG/DL
CHLORIDE SERPL-SCNC: 103 MMOL/L
CHOLEST SERPL-MCNC: 186 MG/DL
CO2 SERPL-SCNC: 26 MMOL/L
CREAT SERPL-MCNC: 0.7 MG/DL
EGFR: 117 ML/MIN/1.73M2
GLUCOSE SERPL-MCNC: 84 MG/DL
HDLC SERPL-MCNC: 54 MG/DL
LDLC SERPL CALC-MCNC: 109 MG/DL
NONHDLC SERPL-MCNC: 132 MG/DL
POTASSIUM SERPL-SCNC: 4.2 MMOL/L
PROT SERPL-MCNC: 7.6 G/DL
SODIUM SERPL-SCNC: 139 MMOL/L
TRIGL SERPL-MCNC: 129 MG/DL

## 2024-12-23 ENCOUNTER — NON-APPOINTMENT (OUTPATIENT)
Age: 44
End: 2024-12-23

## 2025-03-27 ENCOUNTER — APPOINTMENT (OUTPATIENT)
Dept: CARDIOLOGY | Facility: CLINIC | Age: 45
End: 2025-03-27
Payer: COMMERCIAL

## 2025-03-27 ENCOUNTER — NON-APPOINTMENT (OUTPATIENT)
Age: 45
End: 2025-03-27

## 2025-03-27 VITALS
BODY MASS INDEX: 39.69 KG/M2 | WEIGHT: 268 LBS | OXYGEN SATURATION: 98 % | DIASTOLIC BLOOD PRESSURE: 80 MMHG | HEART RATE: 74 BPM | SYSTOLIC BLOOD PRESSURE: 122 MMHG | HEIGHT: 69 IN

## 2025-03-27 DIAGNOSIS — Z98.84 BARIATRIC SURGERY STATUS: ICD-10-CM

## 2025-03-27 DIAGNOSIS — E66.01 MORBID (SEVERE) OBESITY DUE TO EXCESS CALORIES: ICD-10-CM

## 2025-03-27 DIAGNOSIS — I10 ESSENTIAL (PRIMARY) HYPERTENSION: ICD-10-CM

## 2025-03-27 DIAGNOSIS — G47.33 OBSTRUCTIVE SLEEP APNEA (ADULT) (PEDIATRIC): ICD-10-CM

## 2025-03-27 PROCEDURE — 99204 OFFICE O/P NEW MOD 45 MIN: CPT

## 2025-03-27 PROCEDURE — 93000 ELECTROCARDIOGRAM COMPLETE: CPT

## 2025-03-27 PROCEDURE — G2211 COMPLEX E/M VISIT ADD ON: CPT

## 2025-04-19 ENCOUNTER — OUTPATIENT (OUTPATIENT)
Dept: OUTPATIENT SERVICES | Facility: HOSPITAL | Age: 45
LOS: 1 days | End: 2025-04-19
Payer: SELF-PAY

## 2025-04-19 ENCOUNTER — APPOINTMENT (OUTPATIENT)
Dept: CT IMAGING | Facility: CLINIC | Age: 45
End: 2025-04-19
Payer: SELF-PAY

## 2025-04-19 DIAGNOSIS — Z90.49 ACQUIRED ABSENCE OF OTHER SPECIFIED PARTS OF DIGESTIVE TRACT: Chronic | ICD-10-CM

## 2025-04-19 DIAGNOSIS — I10 ESSENTIAL (PRIMARY) HYPERTENSION: ICD-10-CM

## 2025-04-19 DIAGNOSIS — Z00.8 ENCOUNTER FOR OTHER GENERAL EXAMINATION: ICD-10-CM

## 2025-04-19 DIAGNOSIS — Z90.3 ACQUIRED ABSENCE OF STOMACH [PART OF]: Chronic | ICD-10-CM

## 2025-04-19 PROCEDURE — 75571 CT HRT W/O DYE W/CA TEST: CPT

## 2025-04-19 PROCEDURE — 75571 CT HRT W/O DYE W/CA TEST: CPT | Mod: 26

## 2025-04-28 ENCOUNTER — APPOINTMENT (OUTPATIENT)
Dept: CARDIOLOGY | Facility: CLINIC | Age: 45
End: 2025-04-28
Payer: COMMERCIAL

## 2025-04-28 PROCEDURE — 93015 CV STRESS TEST SUPVJ I&R: CPT

## 2025-04-28 PROCEDURE — 93306 TTE W/DOPPLER COMPLETE: CPT

## 2025-05-02 ENCOUNTER — APPOINTMENT (OUTPATIENT)
Dept: CARDIOLOGY | Facility: CLINIC | Age: 45
End: 2025-05-02
Payer: COMMERCIAL

## 2025-05-02 VITALS
BODY MASS INDEX: 38.8 KG/M2 | DIASTOLIC BLOOD PRESSURE: 82 MMHG | HEIGHT: 69 IN | HEART RATE: 69 BPM | WEIGHT: 262 LBS | SYSTOLIC BLOOD PRESSURE: 124 MMHG | OXYGEN SATURATION: 98 %

## 2025-05-02 DIAGNOSIS — I10 ESSENTIAL (PRIMARY) HYPERTENSION: ICD-10-CM

## 2025-05-02 DIAGNOSIS — G47.33 OBSTRUCTIVE SLEEP APNEA (ADULT) (PEDIATRIC): ICD-10-CM

## 2025-05-02 PROCEDURE — 99214 OFFICE O/P EST MOD 30 MIN: CPT

## 2025-05-02 PROCEDURE — 93000 ELECTROCARDIOGRAM COMPLETE: CPT

## 2025-05-02 PROCEDURE — G2211 COMPLEX E/M VISIT ADD ON: CPT

## 2025-05-08 NOTE — PATIENT PROFILE ADULT - NSPROHMSYMPCOND_GEN_A_NUR
Health Maintenance       Annual Physical (ages 3 - 21) (Yearly)  Overdue since 1/15/2022    COVID-19 Vaccine (3 - 2024-25 season)  Overdue since 9/1/2024    DTaP/Tdap/Td Vaccine (7 - Td or Tdap)  Overdue since 3/5/2025    Meningococcal Serogroup B Vaccine (1 of 2 - Standard)  Never done           Following review of the above:  Patient is not proceeding with: COVID-19    Note: Refer to final orders and clinician documentation.         cardiovascular

## 2025-05-31 ENCOUNTER — APPOINTMENT (OUTPATIENT)
Dept: CT IMAGING | Facility: CLINIC | Age: 45
End: 2025-05-31

## 2025-06-02 ENCOUNTER — APPOINTMENT (OUTPATIENT)
Dept: BARIATRICS/WEIGHT MGMT | Facility: CLINIC | Age: 45
End: 2025-06-02

## 2025-08-08 ENCOUNTER — APPOINTMENT (OUTPATIENT)
Dept: INTERNAL MEDICINE | Facility: CLINIC | Age: 45
End: 2025-08-08
Payer: COMMERCIAL

## 2025-08-08 VITALS
HEIGHT: 69 IN | HEART RATE: 61 BPM | TEMPERATURE: 98 F | SYSTOLIC BLOOD PRESSURE: 118 MMHG | DIASTOLIC BLOOD PRESSURE: 86 MMHG | OXYGEN SATURATION: 99 % | BODY MASS INDEX: 37.47 KG/M2 | WEIGHT: 253 LBS

## 2025-08-08 VITALS — SYSTOLIC BLOOD PRESSURE: 118 MMHG | DIASTOLIC BLOOD PRESSURE: 72 MMHG

## 2025-08-08 DIAGNOSIS — G47.33 OBSTRUCTIVE SLEEP APNEA (ADULT) (PEDIATRIC): ICD-10-CM

## 2025-08-08 DIAGNOSIS — I10 ESSENTIAL (PRIMARY) HYPERTENSION: ICD-10-CM

## 2025-08-08 DIAGNOSIS — Z00.00 ENCOUNTER FOR GENERAL ADULT MEDICAL EXAMINATION W/OUT ABNORMAL FINDINGS: ICD-10-CM

## 2025-08-08 DIAGNOSIS — E66.01 MORBID (SEVERE) OBESITY DUE TO EXCESS CALORIES: ICD-10-CM

## 2025-08-08 PROCEDURE — G2211 COMPLEX E/M VISIT ADD ON: CPT

## 2025-08-08 PROCEDURE — 99214 OFFICE O/P EST MOD 30 MIN: CPT

## 2025-08-08 RX ORDER — TIRZEPATIDE 7.5 MG/.5ML
INJECTION, SOLUTION SUBCUTANEOUS
Refills: 0 | Status: ACTIVE | COMMUNITY

## 2025-08-18 LAB
25(OH)D3 SERPL-MCNC: 24.1 NG/ML
ALBUMIN SERPL ELPH-MCNC: 4.1 G/DL
ALP BLD-CCNC: 75 U/L
ALT SERPL-CCNC: 27 U/L
ANION GAP SERPL CALC-SCNC: 13 MMOL/L
AST SERPL-CCNC: 22 U/L
BASOPHILS # BLD AUTO: 0.04 K/UL
BASOPHILS NFR BLD AUTO: 0.7 %
BILIRUB SERPL-MCNC: 1.4 MG/DL
BUN SERPL-MCNC: 17 MG/DL
CALCIUM SERPL-MCNC: 9.7 MG/DL
CHLORIDE SERPL-SCNC: 105 MMOL/L
CHOLEST SERPL-MCNC: 169 MG/DL
CO2 SERPL-SCNC: 24 MMOL/L
CREAT SERPL-MCNC: 0.7 MG/DL
EGFRCR SERPLBLD CKD-EPI 2021: 116 ML/MIN/1.73M2
EOSINOPHIL # BLD AUTO: 0.06 K/UL
EOSINOPHIL NFR BLD AUTO: 1 %
ESTIMATED AVERAGE GLUCOSE: 74 MG/DL
GLUCOSE SERPL-MCNC: 70 MG/DL
HBA1C MFR BLD HPLC: 4.2 %
HCT VFR BLD CALC: 46.9 %
HDLC SERPL-MCNC: 56 MG/DL
HGB BLD-MCNC: 15.3 G/DL
IMM GRANULOCYTES NFR BLD AUTO: 0.2 %
LDLC SERPL-MCNC: 96 MG/DL
LYMPHOCYTES # BLD AUTO: 1.93 K/UL
LYMPHOCYTES NFR BLD AUTO: 33.5 %
MAN DIFF?: NORMAL
MCHC RBC-ENTMCNC: 30.9 PG
MCHC RBC-ENTMCNC: 32.6 G/DL
MCV RBC AUTO: 94.7 FL
MONOCYTES # BLD AUTO: 0.36 K/UL
MONOCYTES NFR BLD AUTO: 6.3 %
NEUTROPHILS # BLD AUTO: 3.36 K/UL
NEUTROPHILS NFR BLD AUTO: 58.3 %
NONHDLC SERPL-MCNC: 113 MG/DL
PLATELET # BLD AUTO: 254 K/UL
POTASSIUM SERPL-SCNC: 4.4 MMOL/L
PROT SERPL-MCNC: 7.1 G/DL
RBC # BLD: 4.95 M/UL
RBC # FLD: 12.4 %
SODIUM SERPL-SCNC: 142 MMOL/L
TRIGL SERPL-MCNC: 94 MG/DL
TSH SERPL-ACNC: 1.02 UIU/ML
WBC # FLD AUTO: 5.76 K/UL

## 2025-08-19 DIAGNOSIS — R17 UNSPECIFIED JAUNDICE: ICD-10-CM
